# Patient Record
Sex: FEMALE | Race: WHITE | NOT HISPANIC OR LATINO | URBAN - METROPOLITAN AREA
[De-identification: names, ages, dates, MRNs, and addresses within clinical notes are randomized per-mention and may not be internally consistent; named-entity substitution may affect disease eponyms.]

---

## 2019-07-16 ENCOUNTER — INPATIENT (INPATIENT)
Facility: HOSPITAL | Age: 69
LOS: 2 days | Discharge: HOME | End: 2019-07-19
Attending: GENERAL PRACTICE | Admitting: GENERAL PRACTICE
Payer: MEDICARE

## 2019-07-16 VITALS
TEMPERATURE: 98 F | DIASTOLIC BLOOD PRESSURE: 71 MMHG | RESPIRATION RATE: 18 BRPM | HEART RATE: 108 BPM | SYSTOLIC BLOOD PRESSURE: 146 MMHG

## 2019-07-16 DIAGNOSIS — R93.1 ABNORMAL FINDINGS ON DIAGNOSTIC IMAGING OF HEART AND CORONARY CIRCULATION: ICD-10-CM

## 2019-07-16 LAB
ALBUMIN SERPL ELPH-MCNC: 3.9 G/DL — SIGNIFICANT CHANGE UP (ref 3.3–5)
ALP SERPL-CCNC: 70 U/L — SIGNIFICANT CHANGE UP (ref 40–120)
ALT FLD-CCNC: 12 U/L — SIGNIFICANT CHANGE UP (ref 4–33)
ANION GAP SERPL CALC-SCNC: 13 MMO/L — SIGNIFICANT CHANGE UP (ref 7–14)
APTT BLD: 30 SEC — SIGNIFICANT CHANGE UP (ref 27.5–36.3)
AST SERPL-CCNC: 22 U/L — SIGNIFICANT CHANGE UP (ref 4–32)
BASE EXCESS BLDV CALC-SCNC: -0.5 MMOL/L — SIGNIFICANT CHANGE UP
BASOPHILS # BLD AUTO: 0.02 K/UL — SIGNIFICANT CHANGE UP (ref 0–0.2)
BASOPHILS NFR BLD AUTO: 0.3 % — SIGNIFICANT CHANGE UP (ref 0–2)
BILIRUB SERPL-MCNC: 0.7 MG/DL — SIGNIFICANT CHANGE UP (ref 0.2–1.2)
BLOOD GAS VENOUS - CREATININE: 0.66 MG/DL — SIGNIFICANT CHANGE UP (ref 0.5–1.3)
BUN SERPL-MCNC: 21 MG/DL — SIGNIFICANT CHANGE UP (ref 7–23)
CALCIUM SERPL-MCNC: 9.8 MG/DL — SIGNIFICANT CHANGE UP (ref 8.4–10.5)
CHLORIDE BLDV-SCNC: 108 MMOL/L — SIGNIFICANT CHANGE UP (ref 96–108)
CHLORIDE SERPL-SCNC: 106 MMOL/L — SIGNIFICANT CHANGE UP (ref 98–107)
CO2 SERPL-SCNC: 22 MMOL/L — SIGNIFICANT CHANGE UP (ref 22–31)
CREAT SERPL-MCNC: 0.68 MG/DL — SIGNIFICANT CHANGE UP (ref 0.5–1.3)
EOSINOPHIL # BLD AUTO: 0.05 K/UL — SIGNIFICANT CHANGE UP (ref 0–0.5)
EOSINOPHIL NFR BLD AUTO: 0.7 % — SIGNIFICANT CHANGE UP (ref 0–6)
GAS PNL BLDV: 139 MMOL/L — SIGNIFICANT CHANGE UP (ref 136–146)
GLUCOSE BLDV-MCNC: 127 MG/DL — HIGH (ref 70–99)
GLUCOSE SERPL-MCNC: 136 MG/DL — HIGH (ref 70–99)
HCO3 BLDV-SCNC: 23 MMOL/L — SIGNIFICANT CHANGE UP (ref 20–27)
HCT VFR BLD CALC: 37.5 % — SIGNIFICANT CHANGE UP (ref 34.5–45)
HCT VFR BLDV CALC: 38.3 % — SIGNIFICANT CHANGE UP (ref 34.5–45)
HGB BLD-MCNC: 11.9 G/DL — SIGNIFICANT CHANGE UP (ref 11.5–15.5)
HGB BLDV-MCNC: 12.5 G/DL — SIGNIFICANT CHANGE UP (ref 11.5–15.5)
IMM GRANULOCYTES NFR BLD AUTO: 0.4 % — SIGNIFICANT CHANGE UP (ref 0–1.5)
INR BLD: 1.07 — SIGNIFICANT CHANGE UP (ref 0.88–1.17)
LACTATE BLDV-MCNC: 1.9 MMOL/L — SIGNIFICANT CHANGE UP (ref 0.5–2)
LYMPHOCYTES # BLD AUTO: 1.54 K/UL — SIGNIFICANT CHANGE UP (ref 1–3.3)
LYMPHOCYTES # BLD AUTO: 21.3 % — SIGNIFICANT CHANGE UP (ref 13–44)
MCHC RBC-ENTMCNC: 29.8 PG — SIGNIFICANT CHANGE UP (ref 27–34)
MCHC RBC-ENTMCNC: 31.7 % — LOW (ref 32–36)
MCV RBC AUTO: 94 FL — SIGNIFICANT CHANGE UP (ref 80–100)
MONOCYTES # BLD AUTO: 0.72 K/UL — SIGNIFICANT CHANGE UP (ref 0–0.9)
MONOCYTES NFR BLD AUTO: 10 % — SIGNIFICANT CHANGE UP (ref 2–14)
NEUTROPHILS # BLD AUTO: 4.86 K/UL — SIGNIFICANT CHANGE UP (ref 1.8–7.4)
NEUTROPHILS NFR BLD AUTO: 67.3 % — SIGNIFICANT CHANGE UP (ref 43–77)
NRBC # FLD: 0 K/UL — SIGNIFICANT CHANGE UP (ref 0–0)
PCO2 BLDV: 45 MMHG — SIGNIFICANT CHANGE UP (ref 41–51)
PH BLDV: 7.35 PH — SIGNIFICANT CHANGE UP (ref 7.32–7.43)
PLATELET # BLD AUTO: 300 K/UL — SIGNIFICANT CHANGE UP (ref 150–400)
PMV BLD: 9.9 FL — SIGNIFICANT CHANGE UP (ref 7–13)
PO2 BLDV: 38 MMHG — SIGNIFICANT CHANGE UP (ref 35–40)
POTASSIUM BLDV-SCNC: 3.7 MMOL/L — SIGNIFICANT CHANGE UP (ref 3.4–4.5)
POTASSIUM SERPL-MCNC: 4 MMOL/L — SIGNIFICANT CHANGE UP (ref 3.5–5.3)
POTASSIUM SERPL-SCNC: 4 MMOL/L — SIGNIFICANT CHANGE UP (ref 3.5–5.3)
PROT SERPL-MCNC: 7.7 G/DL — SIGNIFICANT CHANGE UP (ref 6–8.3)
PROTHROM AB SERPL-ACNC: 11.9 SEC — SIGNIFICANT CHANGE UP (ref 9.8–13.1)
RBC # BLD: 3.99 M/UL — SIGNIFICANT CHANGE UP (ref 3.8–5.2)
RBC # FLD: 16.3 % — HIGH (ref 10.3–14.5)
SAO2 % BLDV: 64.4 % — SIGNIFICANT CHANGE UP (ref 60–85)
SODIUM SERPL-SCNC: 141 MMOL/L — SIGNIFICANT CHANGE UP (ref 135–145)
WBC # BLD: 7.22 K/UL — SIGNIFICANT CHANGE UP (ref 3.8–10.5)
WBC # FLD AUTO: 7.22 K/UL — SIGNIFICANT CHANGE UP (ref 3.8–10.5)

## 2019-07-16 NOTE — ED PROVIDER NOTE - OBJECTIVE STATEMENT
AV: 69y F PMH breast ca s/p resection s/p chemo s/p RT on herceptin presents with abnormal echo. Patient was sent by onc (Ike Aquino) to have echo in setting of intermittent shortness of breath. Today echo revealed decreased LV function, sent to emergency department for admission. Denies chest pain or shortness of breath at this time.

## 2019-07-16 NOTE — ED PROVIDER NOTE - ATTENDING CONTRIBUTION TO CARE
Gong: I have seen and examined the patient face to face, have reviewed and addended the HPI, PE and a/p as necessary    69y F PMH breast ca s/p resection s/p chemo s/p RT on herceptin presents with abnormal echo. Patient was sent by onc (Ike Aquino) to have echo in setting of intermittent shortness of breath. Today echo revealed decreased LV function, sent to emergency department for admission. Denies chest pain or shortness of breath at this time.    68 yo F with breast CA s/p resection, chemo and radiation on herpceptin, a/w abnormal outpt echo showing severe LV dysfunction.  Pt was seen by Dr. Andrade who referred to ED for ischemic work up.  Pt reports having worsening shortness of breath over the past month since completing chemo.  Reports exertional sob when walking up stairs.  denies current chest pain or shortness of breath.      GEN - NAD; well appearing; A+O x3; non-toxic appearing  CARD -s1s2, RRR, no M,G,R;   PULM - CTA b/l, symmetric breath sounds;   ABD:  +BS, ND, NT, soft, no guarding, no rebound, no masses;   BACK: no CVA tenderness, Normal  spine;   EXT: symmetric pulses, 2+ dp, capillary refill < 2 seconds, no clubbing, no cyanosis, no edema    EKG NSR 99 with twave inverison in v2. No stemi.    68 yo F a/w severe LV dysfunciton on outpt echo sent in for admission for ischemic work up.  Will check troponin,r repeat ekg, chest xray, re-eval.  discuss with Dr. Andrade and tami for admission.

## 2019-07-16 NOTE — ED PROVIDER NOTE - CLINICAL SUMMARY MEDICAL DECISION MAKING FREE TEXT BOX
AV: abnormal echo sent in for admission. tachy, afebrile. non-focal exam. Will send basic labs, admit.

## 2019-07-16 NOTE — ED ADULT TRIAGE NOTE - CHIEF COMPLAINT QUOTE
Pt with left breast cancer finished last radiation treatments last Friday. Pt was sent in today by her cardiologist for abnormal  echo and for admission.  Pt deneis sob or chest pain at this time.

## 2019-07-16 NOTE — ED PROVIDER NOTE - CONSTITUTIONAL, MLM
normal... Well appearing, well nourished, awake, alert, oriented to person, place, time/situation and in no apparent distress. Additional Progress Note...

## 2019-07-16 NOTE — ED ADULT NURSE NOTE - OBJECTIVE STATEMENT
Pt with left breast cancer finished last radiation treatments last Friday. Pt was sent in today by her cardiologist for abnormal  echo and for admission.  Pt deneis sob or chest pain at this time.  Patient to room 22 and has  at bedside. Pt evaluated by MD. IVL placed to right AC 20 gauge and labs drawn and sent. Pt has no c/o pain at this time. Pt was sent to be admitted by her PMD and is aware. Pt waiting for results, further orders and bed assignment.    ORLANDO Mendez

## 2019-07-16 NOTE — ED ADULT NURSE REASSESSMENT NOTE - NS ED NURSE REASSESS COMMENT FT1
pt reassessed before calling report to ORLANDO XAVIER. pt denies any distress/ pain/ sob/ palpitations at this time. pt states she feels "great". report given to ORLANDO Xavier. will continue to monitor .

## 2019-07-17 DIAGNOSIS — E03.9 HYPOTHYROIDISM, UNSPECIFIED: ICD-10-CM

## 2019-07-17 DIAGNOSIS — R93.1 ABNORMAL FINDINGS ON DIAGNOSTIC IMAGING OF HEART AND CORONARY CIRCULATION: ICD-10-CM

## 2019-07-17 DIAGNOSIS — Z98.890 OTHER SPECIFIED POSTPROCEDURAL STATES: Chronic | ICD-10-CM

## 2019-07-17 DIAGNOSIS — Z29.9 ENCOUNTER FOR PROPHYLACTIC MEASURES, UNSPECIFIED: ICD-10-CM

## 2019-07-17 LAB
ANION GAP SERPL CALC-SCNC: 13 MMO/L — SIGNIFICANT CHANGE UP (ref 7–14)
BUN SERPL-MCNC: 18 MG/DL — SIGNIFICANT CHANGE UP (ref 7–23)
CALCIUM SERPL-MCNC: 9.4 MG/DL — SIGNIFICANT CHANGE UP (ref 8.4–10.5)
CHLORIDE SERPL-SCNC: 105 MMOL/L — SIGNIFICANT CHANGE UP (ref 98–107)
CHOLEST SERPL-MCNC: 187 MG/DL — SIGNIFICANT CHANGE UP (ref 120–199)
CK MB BLD-MCNC: 1.26 NG/ML — SIGNIFICANT CHANGE UP (ref 1–4.7)
CK SERPL-CCNC: 42 U/L — SIGNIFICANT CHANGE UP (ref 25–170)
CO2 SERPL-SCNC: 22 MMOL/L — SIGNIFICANT CHANGE UP (ref 22–31)
CREAT SERPL-MCNC: 0.56 MG/DL — SIGNIFICANT CHANGE UP (ref 0.5–1.3)
GLUCOSE SERPL-MCNC: 86 MG/DL — SIGNIFICANT CHANGE UP (ref 70–99)
HBA1C BLD-MCNC: 5.6 % — SIGNIFICANT CHANGE UP (ref 4–5.6)
HCT VFR BLD CALC: 37.3 % — SIGNIFICANT CHANGE UP (ref 34.5–45)
HDLC SERPL-MCNC: 45 MG/DL — SIGNIFICANT CHANGE UP (ref 45–65)
HGB BLD-MCNC: 12 G/DL — SIGNIFICANT CHANGE UP (ref 11.5–15.5)
LIPID PNL WITH DIRECT LDL SERPL: 134 MG/DL — SIGNIFICANT CHANGE UP
MAGNESIUM SERPL-MCNC: 1.9 MG/DL — SIGNIFICANT CHANGE UP (ref 1.6–2.6)
MCHC RBC-ENTMCNC: 30.8 PG — SIGNIFICANT CHANGE UP (ref 27–34)
MCHC RBC-ENTMCNC: 32.2 % — SIGNIFICANT CHANGE UP (ref 32–36)
MCV RBC AUTO: 95.9 FL — SIGNIFICANT CHANGE UP (ref 80–100)
NRBC # FLD: 0 K/UL — SIGNIFICANT CHANGE UP (ref 0–0)
NT-PROBNP SERPL-SCNC: 1211 PG/ML — SIGNIFICANT CHANGE UP
PHOSPHATE SERPL-MCNC: 3.3 MG/DL — SIGNIFICANT CHANGE UP (ref 2.5–4.5)
PLATELET # BLD AUTO: 284 K/UL — SIGNIFICANT CHANGE UP (ref 150–400)
PMV BLD: 9.9 FL — SIGNIFICANT CHANGE UP (ref 7–13)
POTASSIUM SERPL-MCNC: 3.9 MMOL/L — SIGNIFICANT CHANGE UP (ref 3.5–5.3)
POTASSIUM SERPL-SCNC: 3.9 MMOL/L — SIGNIFICANT CHANGE UP (ref 3.5–5.3)
RBC # BLD: 3.89 M/UL — SIGNIFICANT CHANGE UP (ref 3.8–5.2)
RBC # FLD: 16.4 % — HIGH (ref 10.3–14.5)
SODIUM SERPL-SCNC: 140 MMOL/L — SIGNIFICANT CHANGE UP (ref 135–145)
TRIGL SERPL-MCNC: 129 MG/DL — SIGNIFICANT CHANGE UP (ref 10–149)
TROPONIN T, HIGH SENSITIVITY: 8 NG/L — SIGNIFICANT CHANGE UP (ref ?–14)
TSH SERPL-MCNC: 6.02 UIU/ML — HIGH (ref 0.27–4.2)
WBC # BLD: 5.27 K/UL — SIGNIFICANT CHANGE UP (ref 3.8–10.5)
WBC # FLD AUTO: 5.27 K/UL — SIGNIFICANT CHANGE UP (ref 3.8–10.5)

## 2019-07-17 PROCEDURE — 93306 TTE W/DOPPLER COMPLETE: CPT | Mod: 26

## 2019-07-17 RX ORDER — HEPARIN SODIUM 5000 [USP'U]/ML
5000 INJECTION INTRAVENOUS; SUBCUTANEOUS EVERY 12 HOURS
Refills: 0 | Status: DISCONTINUED | OUTPATIENT
Start: 2019-07-17 | End: 2019-07-19

## 2019-07-17 RX ORDER — LISINOPRIL 2.5 MG/1
2.5 TABLET ORAL DAILY
Refills: 0 | Status: DISCONTINUED | OUTPATIENT
Start: 2019-07-17 | End: 2019-07-19

## 2019-07-17 RX ORDER — LEVOTHYROXINE SODIUM 125 MCG
50 TABLET ORAL DAILY
Refills: 0 | Status: DISCONTINUED | OUTPATIENT
Start: 2019-07-17 | End: 2019-07-19

## 2019-07-17 RX ORDER — LISINOPRIL 2.5 MG/1
2.5 TABLET ORAL DAILY
Refills: 0 | Status: DISCONTINUED | OUTPATIENT
Start: 2019-07-17 | End: 2019-07-17

## 2019-07-17 RX ORDER — METOPROLOL TARTRATE 50 MG
12.5 TABLET ORAL EVERY 12 HOURS
Refills: 0 | Status: DISCONTINUED | OUTPATIENT
Start: 2019-07-17 | End: 2019-07-18

## 2019-07-17 RX ORDER — METOPROLOL TARTRATE 50 MG
12.5 TABLET ORAL
Refills: 0 | Status: DISCONTINUED | OUTPATIENT
Start: 2019-07-17 | End: 2019-07-17

## 2019-07-17 RX ADMIN — Medication 50 MICROGRAM(S): at 05:38

## 2019-07-17 RX ADMIN — LISINOPRIL 2.5 MILLIGRAM(S): 2.5 TABLET ORAL at 09:46

## 2019-07-17 RX ADMIN — Medication 1 APPLICATION(S): at 17:11

## 2019-07-17 RX ADMIN — Medication 12.5 MILLIGRAM(S): at 17:24

## 2019-07-17 NOTE — H&P ADULT - NSHPLABSRESULTS_GEN_ALL_CORE
11.9   7.22  )-----------( 300      ( 16 Jul 2019 18:55 )             37.5     07-16    141  |  106  |  21  ----------------------------<  136<H>  4.0   |  22  |  0.68    Ca    9.8      16 Jul 2019 18:55    TPro  7.7  /  Alb  3.9  /  TBili  0.7  /  DBili  x   /  AST  22  /  ALT  12  /  AlkPhos  70  07-16    EKG: Sinus rhythm with PAC, possible LAE, LAD at a rate of 99 with TWI in lead V2 and QTc of 454.

## 2019-07-17 NOTE — H&P ADULT - RS GEN PE MLT RESP DETAILS PC
no rhonchi/breath sounds equal/no rales/clear to auscultation bilaterally/good air movement/respirations non-labored/no wheezes/no chest wall tenderness/no intercostal retractions/normal/airway patent

## 2019-07-17 NOTE — H&P ADULT - NSHPSOCIALHISTORY_GEN_ALL_CORE
Mom is calling because she would like Dotty to [facility] [employer] with her. Since patient is under 18 they need a doctors note. Mom is wondering if  would be able to do this. Mom aware that  is out of the office today and tomorrow.   , lives with , retired   Quit smoking in November 2018 and smoked 1 pack a day/denied any drinking or any illicit drug use

## 2019-07-17 NOTE — H&P ADULT - NEGATIVE OPHTHALMOLOGIC SYMPTOMS
no lacrimation L/no photophobia/no diplopia/no blurred vision L/no lacrimation R/no blurred vision R/no discharge R/no discharge L

## 2019-07-17 NOTE — H&P ADULT - NEGATIVE NEUROLOGICAL SYMPTOMS
no generalized seizures/no tremors/no loss of sensation/no transient paralysis/no weakness/no paresthesias/no loss of consciousness/no hemiparesis/no syncope/no difficulty walking/no focal seizures/no vertigo/no headache/no confusion

## 2019-07-17 NOTE — H&P ADULT - GASTROINTESTINAL DETAILS
nontender/no guarding/no rigidity/soft/no rebound tenderness/normal/no distention/bowel sounds normal

## 2019-07-17 NOTE — H&P ADULT - PROBLEM SELECTOR PLAN 1
Patient sent from cardiologist office for abnormal TTE. Currently euvolemic on physical examination   Will monitor on telemetry for now   HgBA1C, TSH, lipid profile, CBC, CMP in am   TTE ordered   Will need to call Dr. Andrade in am for Cardiology consult   Consider ischemic workup with NST vs LHC this admission to r/o possible ICM vs NICM(chemo induced)

## 2019-07-17 NOTE — H&P ADULT - NEGATIVE ENMT SYMPTOMS
no nasal congestion/no hearing difficulty/no ear pain/no nasal discharge/no tinnitus/no vertigo/no sinus symptoms

## 2019-07-17 NOTE — H&P ADULT - NEGATIVE GASTROINTESTINAL SYMPTOMS
no melena/no constipation/no hematochezia/no diarrhea/no nausea/no abdominal pain/no vomiting/no change in bowel habits

## 2019-07-17 NOTE — H&P ADULT - NEGATIVE MUSCULOSKELETAL SYMPTOMS
no myalgia/no arthralgia/no joint swelling/no muscle weakness/no arthritis/no stiffness/no muscle cramps/no neck pain

## 2019-07-17 NOTE — H&P ADULT - HISTORY OF PRESENT ILLNESS
70 y/o F with PMH of Breast cancer(s/p resection and chemo/RT and currently on Herceptin), Hypothyroidism presented from cardiology clinic for abnormal TTE. As per the patient she had intermittent SOB when she climbs a flight of stairs or when she is sitting up after laying down. Patient stated that she has gotten 28 sessions of radiation of after the first radiation treatment 3 weeks later she developed the SOB. Since patient was started on IV chemotherapy(has 3 more doses left every 3 weeks and last done was last week) she was told by her oncologist to go and see a cardiologist which she did yesterday and had a TTE and was told that she had decreased LV function and needed to come to the ER. Patient denied any CP, RAY, fevers, chills, N/V/D/C, abdominal pain, dysuria, melena, hematochezia, recent travel, sick contact, pleuritic or positional chest pain.     On ED admission EKG revealed Sinus rhythm with PAC, possible LAE, LAD at a rate of 99 with TWI in lead V2 and QTc of 454. 70 y/o F with PMH of Breast cancer(s/p resection and chemo/RT and currently on Herceptin), Hypothyroidism presented from cardiology clinic for abnormal TTE. As per the patient she had intermittent SOB when she climbs a flight of stairs or when she is sitting up after laying down. Patient stated that she has gotten 28 sessions of radiation of after the first radiation treatment 3 weeks later she developed the SOB.   Since patient was started on IV chemotherapy(has 3 more doses left every 3 weeks and last done was last week) she was told by her oncologist to go and see a cardiologist which she did yesterday and had a TTE and was told that she had decreased LV function and needed to come to the ER. Patient denied any CP, RAY, fevers, chills, N/V/D/C, abdominal pain, dysuria, melena, hematochezia, recent travel, sick contact, pleuritic or positional chest pain.     On ED admission EKG revealed Sinus rhythm with PAC, possible LAE, LAD at a rate of 99 with TWI in lead V2 and QTc of 454.

## 2019-07-17 NOTE — H&P ADULT - NSICDXPASTMEDICALHX_GEN_ALL_CORE_FT
PAST MEDICAL HISTORY:  Breast cancer left side s/p lumpectomy, chemotherapy and radiation    Hypothyroidism

## 2019-07-17 NOTE — CONSULT NOTE ADULT - SUBJECTIVE AND OBJECTIVE BOX
CHIEF COMPLAINT: chf    HISTORY OF PRESENT ILLNESS:  70 y/o F with PMH of Breast cancer(s/p resection and chemo/RT and currently on Herceptin), Hypothyroidism presented from cardiology clinic for abnormal TTE. As per the patient she had intermittent SOB when she climbs a flight of stairs or when she is sitting up after laying down. Patient stated that she has gotten 28 sessions of radiation of after the first radiation treatment 3 weeks later she developed the SOB. Since patient was started on IV chemotherapy(has 3 more doses left every 3 weeks and last done was last week) she was told by her oncologist to go and see a cardiologist which she did yesterday and had a TTE and was told that she had decreased LV function and needed to come to the ER. Patient denied any CP, RAY, fevers, chills, N/V/D/C, abdominal pain, dysuria, melena, hematochezia, recent travel, sick contact, pleuritic or positional chest pain.     On ED admission EKG revealed Sinus rhythm with PAC, possible LAE, LAD at a rate of 99 with TWI in lead V2 and QTc of 454.     Allergies  No Known Allergies        MEDICATIONS:  heparin  Injectable 5000 Unit(s) SubCutaneous every 12 hours  levothyroxine 50 MICROGram(s) Oral daily        PAST MEDICAL & SURGICAL HISTORY:  Hypothyroidism  Breast cancer: left side s/p lumpectomy, chemotherapy and radiation  S/P breast lumpectomy: left sided      FAMILY HISTORY:  NC    SOCIAL HISTORY:    non smoker. indpendent in adl. lives with       REVIEW OF SYSTEMS:  See HPI, otherwise complete 10 point review of systems negative    [ ] All others negative	    PHYSICAL EXAM:  T(C): 36.6 (07-17-19 @ 05:31), Max: 36.8 (07-16-19 @ 17:56)  HR: 106 (07-17-19 @ 05:31) (99 - 108)  BP: 119/81 (07-17-19 @ 05:31) (119/81 - 149/81)  RR: 17 (07-17-19 @ 05:31) (17 - 18)  SpO2: 100% (07-17-19 @ 05:31) (98% - 100%)  Wt(kg): --  I&O's Summary      Appearance: No Acute Distress	  HEENT:  Normal oral mucosa, PERRL, EOMI	  Cardiovascular: Normal S1 S2, No JVD, No murmurs/rubs/gallops  Respiratory: Lungs clear to auscultation bilaterally  Gastrointestinal:  Soft, Non-tender, + BS	  Skin: No rashes, No ecchymoses, No cyanosis	  Neurologic: Non-focal  Extremities: No clubbing, cyanosis or edema  Vascular: Peripheral pulses palpable 2+ bilaterally  Psychiatry: A & O x 3, Mood & affect appropriate    Laboratory Data:	 	    CBC Full  -  ( 17 Jul 2019 04:30 )  WBC Count : 5.27 K/uL  Hemoglobin : 12.0 g/dL  Hematocrit : 37.3 %  Platelet Count - Automated : 284 K/uL  Mean Cell Volume : 95.9 fL  Mean Cell Hemoglobin : 30.8 pg  Mean Cell Hemoglobin Concentration : 32.2 %  Auto Neutrophil # : x  Auto Lymphocyte # : x  Auto Monocyte # : x  Auto Eosinophil # : x  Auto Basophil # : x  Auto Neutrophil % : x  Auto Lymphocyte % : x  Auto Monocyte % : x  Auto Eosinophil % : x  Auto Basophil % : x    07-17    140  |  105  |  18  ----------------------------<  86  3.9   |  22  |  0.56  07-16    141  |  106  |  21  ----------------------------<  136<H>  4.0   |  22  |  0.68    Ca    9.4      17 Jul 2019 04:30  Ca    9.8      16 Jul 2019 18:55  Phos  3.3     07-17  Mg     1.9     07-17    TPro  7.7  /  Alb  3.9  /  TBili  0.7  /  DBili  x   /  AST  22  /  ALT  12  /  AlkPhos  70  07-16      proBNP: Serum Pro-Brain Natriuretic Peptide: 1211 pg/mL (07-17 @ 04:30)    Lipid Profile:   HgA1c: Hemoglobin A1C, Whole Blood: 5.6 % (07-17 @ 04:30)    TSH: Thyroid Stimulating Hormone, Serum: 6.02 uIU/mL (07-17 @ 04:30)        CARDIAC MARKERS:      CKMB: 1.26 ng/mL (07-17 @ 04:30)        Interpretation of Telemetry: 	NSR    ECG:  EKG revealed Sinus rhythm with PAC, possible LAE, LAD at a rate of 99 with TWI in lead V2 and QTc of 454. 	      Assessment:  -newly diagnoses severe LV dysfunction with moderate to severe AI  -hx of chemotherapy for breast cancer, currently on herceptin  -dyspnea prior to admission, currently resolved    Recs:  -start lisinopril 2.5mg daily (would prefer valsartan 40mg bid if can get approved to allow for smooth transition to entresto)  -start metop tart 12.5mg bid. will transition to long acting prior to d/c  -hold MCA/aldactone for now  -euvolemic off diuretics  -f/u repeat TTE  -discuss with OP oncologist regarding management of herceptin in setting of severe LV dysfunction  -plan for ischemic eval with [facility] tomm  -c/w tele  -possible life vest prior to dc for primary prevention  -dvt ppx        Greater than 60 minutes spent on total encounter; more than 50% of the visit was spent counseling and/or coordinating care by the attending physician.   	  Shar Andrade MD   Cardiovascular Diseases  [phone]

## 2019-07-17 NOTE — H&P ADULT - NEGATIVE CARDIOVASCULAR SYMPTOMS
no peripheral edema/no chest pain/no orthopnea/no dyspnea on exertion/no paroxysmal nocturnal dyspnea/no palpitations

## 2019-07-17 NOTE — H&P ADULT - ATTENDING COMMENTS
Patient seen, examined, and interviewed by me, case discussed with me, chart reviewed, agree with above H/P as reviewed and edited by me.  See  full note above.  admission for new finding of systolic HF   medical optimization  repeat echo  cardio mgmt appreciated and discussed

## 2019-07-18 LAB
ANION GAP SERPL CALC-SCNC: 10 MMO/L — SIGNIFICANT CHANGE UP (ref 7–14)
BUN SERPL-MCNC: 16 MG/DL — SIGNIFICANT CHANGE UP (ref 7–23)
CALCIUM SERPL-MCNC: 9.2 MG/DL — SIGNIFICANT CHANGE UP (ref 8.4–10.5)
CHLORIDE SERPL-SCNC: 109 MMOL/L — HIGH (ref 98–107)
CO2 SERPL-SCNC: 21 MMOL/L — LOW (ref 22–31)
CREAT SERPL-MCNC: 0.57 MG/DL — SIGNIFICANT CHANGE UP (ref 0.5–1.3)
GLUCOSE SERPL-MCNC: 88 MG/DL — SIGNIFICANT CHANGE UP (ref 70–99)
HCT VFR BLD CALC: 37.2 % — SIGNIFICANT CHANGE UP (ref 34.5–45)
HCV AB S/CO SERPL IA: 0.21 S/CO — SIGNIFICANT CHANGE UP (ref 0–0.99)
HCV AB SERPL-IMP: SIGNIFICANT CHANGE UP
HGB BLD-MCNC: 12.1 G/DL — SIGNIFICANT CHANGE UP (ref 11.5–15.5)
MAGNESIUM SERPL-MCNC: 2 MG/DL — SIGNIFICANT CHANGE UP (ref 1.6–2.6)
MCHC RBC-ENTMCNC: 31.3 PG — SIGNIFICANT CHANGE UP (ref 27–34)
MCHC RBC-ENTMCNC: 32.5 % — SIGNIFICANT CHANGE UP (ref 32–36)
MCV RBC AUTO: 96.1 FL — SIGNIFICANT CHANGE UP (ref 80–100)
NRBC # FLD: 0 K/UL — SIGNIFICANT CHANGE UP (ref 0–0)
PHOSPHATE SERPL-MCNC: 3.4 MG/DL — SIGNIFICANT CHANGE UP (ref 2.5–4.5)
PLATELET # BLD AUTO: 286 K/UL — SIGNIFICANT CHANGE UP (ref 150–400)
PMV BLD: 10.3 FL — SIGNIFICANT CHANGE UP (ref 7–13)
POTASSIUM SERPL-MCNC: 3.9 MMOL/L — SIGNIFICANT CHANGE UP (ref 3.5–5.3)
POTASSIUM SERPL-SCNC: 3.9 MMOL/L — SIGNIFICANT CHANGE UP (ref 3.5–5.3)
RBC # BLD: 3.87 M/UL — SIGNIFICANT CHANGE UP (ref 3.8–5.2)
RBC # FLD: 16.6 % — HIGH (ref 10.3–14.5)
SODIUM SERPL-SCNC: 140 MMOL/L — SIGNIFICANT CHANGE UP (ref 135–145)
WBC # BLD: 4.94 K/UL — SIGNIFICANT CHANGE UP (ref 3.8–10.5)
WBC # FLD AUTO: 4.94 K/UL — SIGNIFICANT CHANGE UP (ref 3.8–10.5)

## 2019-07-18 PROCEDURE — 93320 DOPPLER ECHO COMPLETE: CPT | Mod: 26,GC

## 2019-07-18 PROCEDURE — 76376 3D RENDER W/INTRP POSTPROCES: CPT | Mod: 26

## 2019-07-18 PROCEDURE — 93312 ECHO TRANSESOPHAGEAL: CPT | Mod: 26

## 2019-07-18 PROCEDURE — 93325 DOPPLER ECHO COLOR FLOW MAPG: CPT | Mod: 26,GC

## 2019-07-18 RX ORDER — METOPROLOL TARTRATE 50 MG
25 TABLET ORAL
Refills: 0 | Status: DISCONTINUED | OUTPATIENT
Start: 2019-07-18 | End: 2019-07-19

## 2019-07-18 RX ORDER — METOPROLOL TARTRATE 50 MG
25 TABLET ORAL
Refills: 0 | Status: DISCONTINUED | OUTPATIENT
Start: 2019-07-18 | End: 2019-07-18

## 2019-07-18 RX ADMIN — Medication 50 MICROGRAM(S): at 14:58

## 2019-07-18 RX ADMIN — Medication 1 APPLICATION(S): at 17:02

## 2019-07-18 RX ADMIN — Medication 25 MILLIGRAM(S): at 17:02

## 2019-07-18 RX ADMIN — Medication 1 APPLICATION(S): at 05:13

## 2019-07-18 RX ADMIN — LISINOPRIL 2.5 MILLIGRAM(S): 2.5 TABLET ORAL at 17:02

## 2019-07-18 NOTE — PROGRESS NOTE ADULT - ASSESSMENT
_________________________________________________________________________________________  ========>>  M E D I C A L   A T T E N D I N G    F O L L O W  U P  N O T E  <<=========  -----------------------------------------------------------------------------------------------------    - Patient seen and examined by me approximately thirty minutes ago.  - In summary,  STERLING CHEN is a 69y year old woman who originally presented with new HF   - Patient today overall doing ok, comfortable, eating OK.  no SOb, no CP,, no other c.o otherwise     ==================>> REVIEW OF SYSTEM <<=================    GEN: no fever, no chills, no pain  RESP: no SOB, no cough, no sputum  CVS: no chest pain, no palpitations, no edema  GI: no abdominal pain, no nausea, no constipation, no diarrhea  : no dysuria, no frequency, no hematuria  Neuro: no headache, no dizziness  Derm : no itching, no rash    ==================>> PHYSICAL EXAM <<=================    GEN: A&O X 3 , NAD , comfortable  HEENT: NCAT, PERRL, MMM, hearing intact  Neck: supple , no JVD  CVS: S1S2 , regular , No M/R/G appreciated  PULM: CTA B/L,  no W/R/R appreciated  ABD.: soft. non tender, non distended,  bowel sounds present  Extrem: intact pulses , no edema   PSYCH : normal mood,  not anxious      ==================>> MEDICATIONS <<====================    MEDICATIONS  (STANDING):  heparin  Injectable 5000 Unit(s) SubCutaneous every 12 hours  levothyroxine 50 MICROGram(s) Oral daily  lisinopril 2.5 milliGRAM(s) Oral daily  metoprolol tartrate 25 milliGRAM(s) Oral two times a day  silver sulfADIAZINE 1% Cream 1 Application(s) Topical two times a day    ==================>> VITAL SIGNS <<==================    T(C): 36.7 (07-18-19 @ 17:00), Max: 36.8 (07-17-19 @ 20:15)  HR: 88 (07-18-19 @ 17:00) (78 - 88)  BP: 122/78 (07-18-19 @ 17:00) (101/60 - 122/78)  RR: 18 (07-18-19 @ 17:00) (16 - 18)  SpO2: 98% (07-18-19 @ 17:00) (98% - 100%)    I&O's Summary    17 Jul 2019 07:01  -  18 Jul 2019 07:00  --------------------------------------------------------  IN: 200 mL / OUT: 0 mL / NET: 200 mL     ==================>> LAB AND IMAGING <<==================                        12.1   4.94  )-----------( 286      ( 18 Jul 2019 04:55 )             37.2        140  |  109<H>  |  16  ----------------------------<  88  3.9   |  21<L>  |  0.57    Ca    9.2      18 Jul 2019 04:55  Phos  3.4     07-18  Mg     2.0     07-18    TPro  7.7  /  Alb  3.9  /  TBili  0.7  /  DBili  x   /  AST  22  /  ALT  12  /  AlkPhos  70  07-16    PT/INR - ( 16 Jul 2019 18:55 )   PT: 11.9 SEC;   INR: 1.07     PTT - ( 16 Jul 2019 18:55 )  PTT:30.0 SEC               TSH:      6.02   (07-17-19)           Lipid profile:  (07-17-19)     Total: 187     LDL  : 134     HDL  :45     TG   :129     HgA1C: 5.6  (07-17-19)            < from: Transthoracic Echocardiogram (07.17.19 @ 08:01) >  CONCLUSIONS:  1. Calcified trileaflet aortic valve with normal opening.  At least moderate aortic regurgitation.  2. Normal left ventricular internal dimensions and wall thicknesses.  3. Moderate segmental left ventricular systolic  dysfunction. The inferior and inferolateral walls are hypokinetic.  4. Normal right ventricular size and function.  Consider PASQUALE for further workup of aortic regurgitation, if  clincallly warranted.  < end of copied text >      < from: PASQUALE w/o TTE (w/3D Echo) (07.18.19 @ 20:00) >  CONCLUSIONS:  1. Mitral annular calcification, otherwise normal mitral  valve. Mild mitral regurgitation.  2. Calcified trileaflet aortic valve with normal opening  and central malcoaptation. Severe aortic regurgitation.  Vena contracta width about  0.7-0.8 cm.  3. Normal aortic root (about  3.6 cm at the sinuses of  Valsalva).  Moderate dilatation of the ascending aorta  (about  4.4 cm).  Normal aortic arch and descending thoracic aorta.  4. Normal left atrium.  No left atrium or left atrial appendage thrombus.  5. Moderate global left ventricular systolic dysfunction.  6. Normal right ventricular size and function.  7. Contrast injection demonstrates no evidence of a patent foramen ovale.  < end of copied text >    ___________________________________________________________________________________  ===============>>  A S S E S S M E N T   A N D   P L A N <<===============  ------------------------------------------------------------------------------------------    · Assessment		  70 y/o F with PMH of Breast cancer(s/p resection and chemo/RT and Herceptin), Hypothyroidism presented from cardiology for abnormal TTE    Problem/Plan - 1:  ·  Problem: new diagnosis of systolic heart failure with AR  Currently euvolemic   telemetry    echo as above  plan for ischemic workup tomorrow   cardio appreciated     Problem/Plan - 2:  ·  Problem: Hypothyroidism.    Continue with Synthroid daily   TSH 6 > repeat ordered for t he morning     Problem/Plan - 3:  ·  Problem: Need for prophylactic measure.  Plan: On heparin sq 5000U q12hrs.     --------------------------------------------  Case discussed with pt  Education given on findings and plan of care  ___________________________  HPop Ascencio D.O.  Pager: [phone]

## 2019-07-18 NOTE — PROGRESS NOTE ADULT - SUBJECTIVE AND OBJECTIVE BOX
Cardiovascular Disease Progress Note    Overnight events: No acute events overnight.  s/p tte mod lv dysfunction, at least mod ai. awaiting PASQUALE. no new cardiac sx  Otherwise review of systems negative    Objective Findings:  T(C): 36.6 (07-18-19 @ 05:15), Max: 36.8 (07-17-19 @ 20:15)  HR: 83 (07-18-19 @ 05:15) (70 - 95)  BP: 120/63 (07-18-19 @ 05:15) (101/60 - 128/60)  RR: 16 (07-18-19 @ 05:15) (16 - 18)  SpO2: 100% (07-18-19 @ 05:15) (98% - 100%)  Wt(kg): --  Daily     Daily       Physical Exam:  Gen: NAD  HEENT: EOMI  CV: RRR, normal S1 + S2, no m/r/g  Lungs: CTAB  Abd: soft, non-tender  Ext: No edema    Telemetry:    Laboratory Data:                        12.1   4.94  )-----------( 286      ( 18 Jul 2019 04:55 )             37.2     07-18    140  |  109<H>  |  16  ----------------------------<  88  3.9   |  21<L>  |  0.57    Ca    9.2      18 Jul 2019 04:55  Phos  3.4     07-18  Mg     2.0     07-18    TPro  7.7  /  Alb  3.9  /  TBili  0.7  /  DBili  x   /  AST  22  /  ALT  12  /  AlkPhos  70  07-16    PT/INR - ( 16 Jul 2019 18:55 )   PT: 11.9 SEC;   INR: 1.07          PTT - ( 16 Jul 2019 18:55 )  PTT:30.0 SEC  CARDIAC MARKERS ( 17 Jul 2019 04:30 )  x     / x     / 42 u/L / 1.26 ng/mL / x              Inpatient Medications:  MEDICATIONS  (STANDING):  heparin  Injectable 5000 Unit(s) SubCutaneous every 12 hours  levothyroxine 50 MICROGram(s) Oral daily  lisinopril 2.5 milliGRAM(s) Oral daily  metoprolol tartrate 12.5 milliGRAM(s) Oral every 12 hours  silver sulfADIAZINE 1% Cream 1 Application(s) Topical two times a day      Assessment:  -newly diagnoses severe LV dysfunction with moderate to severe AI  -hx of chemotherapy for breast cancer, currently on herceptin  -dyspnea prior to admission, currently resolved    Recs:  -c/w lisinopril 2.5mg daily (would prefer valsartan 40mg bid if can get approved to allow for smooth transition to entresto)  -inc metop tart to 25 mg bid. will transition to long acting prior to d/c  -hold MCA/aldactone for now  -euvolemic off diuretics  -f/u PASQUALE  -discuss with OP oncologist regarding management of herceptin in setting of severe LV dysfunction  -plan for ischemic eval with [facility] tomm  -c/w tele  -will defer ICD for now given EF ~ 40%  -dvt ppx        Over 25 minutes spent on total encounter; more than 50% of the visit was spent counseling and/or coordinating care by the attending physician.      Shar Andrade MD   Cardiovascular Disease  [phone]

## 2019-07-19 ENCOUNTER — TRANSCRIPTION ENCOUNTER (OUTPATIENT)
Age: 69
End: 2019-07-19

## 2019-07-19 VITALS — SYSTOLIC BLOOD PRESSURE: 100 MMHG | HEART RATE: 93 BPM | DIASTOLIC BLOOD PRESSURE: 61 MMHG

## 2019-07-19 LAB
ANION GAP SERPL CALC-SCNC: 10 MMO/L — SIGNIFICANT CHANGE UP (ref 7–14)
BUN SERPL-MCNC: 17 MG/DL — SIGNIFICANT CHANGE UP (ref 7–23)
CALCIUM SERPL-MCNC: 9.9 MG/DL — SIGNIFICANT CHANGE UP (ref 8.4–10.5)
CHLORIDE SERPL-SCNC: 106 MMOL/L — SIGNIFICANT CHANGE UP (ref 98–107)
CO2 SERPL-SCNC: 24 MMOL/L — SIGNIFICANT CHANGE UP (ref 22–31)
CREAT SERPL-MCNC: 0.63 MG/DL — SIGNIFICANT CHANGE UP (ref 0.5–1.3)
GLUCOSE SERPL-MCNC: 94 MG/DL — SIGNIFICANT CHANGE UP (ref 70–99)
HCT VFR BLD CALC: 39.5 % — SIGNIFICANT CHANGE UP (ref 34.5–45)
HGB BLD-MCNC: 12.6 G/DL — SIGNIFICANT CHANGE UP (ref 11.5–15.5)
MAGNESIUM SERPL-MCNC: 1.9 MG/DL — SIGNIFICANT CHANGE UP (ref 1.6–2.6)
MCHC RBC-ENTMCNC: 30.2 PG — SIGNIFICANT CHANGE UP (ref 27–34)
MCHC RBC-ENTMCNC: 31.9 % — LOW (ref 32–36)
MCV RBC AUTO: 94.7 FL — SIGNIFICANT CHANGE UP (ref 80–100)
NRBC # FLD: 0 K/UL — SIGNIFICANT CHANGE UP (ref 0–0)
PHOSPHATE SERPL-MCNC: 3.5 MG/DL — SIGNIFICANT CHANGE UP (ref 2.5–4.5)
PLATELET # BLD AUTO: 290 K/UL — SIGNIFICANT CHANGE UP (ref 150–400)
PMV BLD: 9.7 FL — SIGNIFICANT CHANGE UP (ref 7–13)
POTASSIUM SERPL-MCNC: 4 MMOL/L — SIGNIFICANT CHANGE UP (ref 3.5–5.3)
POTASSIUM SERPL-SCNC: 4 MMOL/L — SIGNIFICANT CHANGE UP (ref 3.5–5.3)
RBC # BLD: 4.17 M/UL — SIGNIFICANT CHANGE UP (ref 3.8–5.2)
RBC # FLD: 16.3 % — HIGH (ref 10.3–14.5)
SODIUM SERPL-SCNC: 140 MMOL/L — SIGNIFICANT CHANGE UP (ref 135–145)
T3 SERPL-MCNC: 99.2 NG/DL — SIGNIFICANT CHANGE UP (ref 80–200)
T4 FREE SERPL-MCNC: 1.16 NG/DL — SIGNIFICANT CHANGE UP (ref 0.9–1.8)
TSH SERPL-MCNC: 15.02 UIU/ML — HIGH (ref 0.27–4.2)
WBC # BLD: 5.73 K/UL — SIGNIFICANT CHANGE UP (ref 3.8–10.5)
WBC # FLD AUTO: 5.73 K/UL — SIGNIFICANT CHANGE UP (ref 3.8–10.5)

## 2019-07-19 RX ORDER — TRASTUZUMAB-DKST 420 MG/20ML
1 INJECTION, POWDER, LYOPHILIZED, FOR SOLUTION INTRAVENOUS
Qty: 0 | Refills: 0 | DISCHARGE

## 2019-07-19 RX ORDER — METOPROLOL TARTRATE 50 MG
1 TABLET ORAL
Qty: 30 | Refills: 0
Start: 2019-07-19 | End: 2019-08-17

## 2019-07-19 RX ORDER — METOPROLOL TARTRATE 50 MG
25 TABLET ORAL DAILY
Refills: 0 | Status: DISCONTINUED | OUTPATIENT
Start: 2019-07-20 | End: 2019-07-19

## 2019-07-19 RX ORDER — LISINOPRIL 2.5 MG/1
1 TABLET ORAL
Qty: 30 | Refills: 0
Start: 2019-07-19 | End: 2019-08-17

## 2019-07-19 RX ADMIN — Medication 1 APPLICATION(S): at 16:29

## 2019-07-19 RX ADMIN — LISINOPRIL 2.5 MILLIGRAM(S): 2.5 TABLET ORAL at 16:29

## 2019-07-19 RX ADMIN — Medication 25 MILLIGRAM(S): at 05:55

## 2019-07-19 RX ADMIN — Medication 1 APPLICATION(S): at 05:56

## 2019-07-19 RX ADMIN — Medication 50 MICROGRAM(S): at 05:55

## 2019-07-19 NOTE — DISCHARGE NOTE PROVIDER - HOSPITAL COURSE
70 y/o F with PMH of Breast cancer(s/p resection and chemo/RT and currently on Herceptin), Hypothyroidism presented from cardiology clinic for abnormal TTE. As per the patient she had intermittent SOB when she climbs a flight of stairs or when she is sitting up after laying down. Patient stated that she has gotten 28 sessions of radiation of after the first radiation treatment 3 weeks later she developed the SOB. Since patient was started on IV chemotherapy(has 3 more doses left every 3 weeks and last done was last week) she was told by her oncologist to go and see a cardiologist which she did yesterday and had a TTE and was told that she had decreased LV function and needed to come to the ER.         Hospital Course:     1. Abnormal TTE: Cardio c/s, Dr. Padilla  7/17/19: start lisinopril 2.5mg daily (would prefer valsartan 40mg bid if can get approved to allow for smooth transition to entresto) Start metop 12.5mg bid. Will transition to long acting prior to d/c. Hold MCA/aldactone for now. Euvolemic off diuretics.         7/17 TTE:  EF 40, calcified trileaflet aortic valve with normal opening. At least moderate aortic regurgitation. Normal left ventricular internal dimensions and wall thicknesses. Moderate segmental left ventricular systolic dysfunction. The inferior and inferolateral walls are hypokinetic. Normal right ventricular size and function. Consider PASQUALE for further workup of aortic regurgitation, if        7/18: PASQUALE: 1. Mitral annular calcification, otherwise normal mitral valve. Mild mitral regurgitation.    2. Calcified trileaflet aortic valve with normal opening and central malcoaptation. Severe aortic regurgitation. Vena contracta width about  0.7-0.8 cm.    3. Normal aortic root (about  3.6 cm at the sinuses of Valsalva).  Moderate dilatation of the ascending aorta (about  4.4 cm).  Normal aortic arch and descending thoracic aorta.    4. Normal left atrium.  No left atrium or left atrial appendage thrombus.    5. Moderate global left ventricular systolic dysfunction.    6. Normal right ventricular size and function.    7. Contrast injection demonstrates no evidence of a patent foramen ovale.        On 7/19, Pt underwent LHC which revealed ****INCOMPLETE        Cardio f/u: OP f/u with Dr Mars to discuss surgical management of her aortic regurgitation and aortopathy. 70 y/o F with PMH of Breast cancer(s/p resection and chemo/RT and currently on Herceptin), Hypothyroidism presented from cardiology clinic for abnormal TTE. As per the patient she had intermittent SOB when she climbs a flight of stairs or when she is sitting up after laying down. Patient stated that she has gotten 28 sessions of radiation of after the first radiation treatment 3 weeks later she developed the SOB. Since patient was started on IV chemotherapy(has 3 more doses left every 3 weeks and last done was last week) she was told by her oncologist to go and see a cardiologist which she did yesterday and had a TTE and was told that she had decreased LV function and needed to come to the ER.         Hospital Course:     1. Abnormal TTE: Cardiology following Dr. Andrade echo recommended, start lisinopril and metoprolol. TTE:  EF 40%, calcified trileaflet aortic valve with normal opening. At least moderate aortic regurgitation. Normal left ventricular internal dimensions and wall thicknesses. Moderate segmental left ventricular systolic dysfunction. The inferior and inferolateral walls are hypokinetic. Normal right ventricular size and function. Consider PASQUALE for further workup of aortic regurgitation, if        7/18: PASQUALE: 1. Mitral annular calcification, otherwise normal mitral valve. Mild mitral regurgitation.    2. Calcified trileaflet aortic valve with normal opening and central malcoaptation. Severe aortic regurgitation. Vena contracta width about  0.7-0.8 cm.    3. Normal aortic root (about  3.6 cm at the sinuses of Valsalva).  Moderate dilatation of the ascending aorta (about  4.4 cm).  Normal aortic arch and descending thoracic aorta.    4. Normal left atrium.  No left atrium or left atrial appendage thrombus.    5. Moderate global left ventricular systolic dysfunction.    6. Normal right ventricular size and function.    7. Contrast injection demonstrates no evidence of a patent foramen ovale.        On 7/19, Pt underwent cardiac catheterization which revealed dilated aotic root moderately severe AI, mild to moderate global LV dysfunction (LVEF=40-45%) normal right heart pressures and normal coronaries         Cardio f/u: c/w lisinopril 2.5mg daily, toprol XL 25mg daily, no diuretics, OP f/u with Dr Mars to discuss surgical management of her aortic regurgitation and aortopathy. Case discussed with Dr. Ascencio, Pt medically cleared for discharge to home with outpt cardio and CTS f/u.

## 2019-07-19 NOTE — DISCHARGE NOTE PROVIDER - NSDCCAREPROVSEEN_GEN_ALL_CORE_FT
Emmanuelle Ascencio Daniel Jose CHAUDHRY [facility]  Amor Martineshomariam Woodson  Ordering Physician  Cole Barksdale  User ADM  Ahmet Hernandez

## 2019-07-19 NOTE — DISCHARGE NOTE PROVIDER - CARE PROVIDERS DIRECT ADDRESSES
,DirectAddress_Unknown,[email or web address]

## 2019-07-19 NOTE — PROGRESS NOTE ADULT - ASSESSMENT
M E D I C A L   A T T E N D I N G    F O L L O W    U P   N O T E                                     ------------------------------------------------------------------------------------------------    patient evaluated by me, case discussed with team, chart, medications, and physical exam reviewed, labs / tests  and vitals reviewed by me, as bellow.   Patient is stable for discharge today. per discussion with cardio: pt will ann phan as OP with CTS for AVR: pt and  aware and in agreement  Patient to follow up as above   See discharge document for full note.      ==================>> MEDICATIONS <<====================    heparin  Injectable 5000 Unit(s) SubCutaneous every 12 hours  levothyroxine 50 MICROGram(s) Oral daily  lisinopril 2.5 milliGRAM(s) Oral daily  silver sulfADIAZINE 1% Cream 1 Application(s) Topical two times a day    ==================>> VITAL SIGNS <<==================    T(C): 36.7 (07-19-19 @ 05:53), Max: 36.7 (07-18-19 @ 22:01)  HR: 93 (07-19-19 @ 16:29) (93 - 96)  BP: 100/61 (07-19-19 @ 16:29) (100/61 - 114/75)  RR: 18 (07-19-19 @ 05:53) (18 - 18)  SpO2: 98% (07-19-19 @ 05:53) (98% - 99%)       ==================>> LAB AND IMAGING <<==================                        12.6   5.73  )-----------( 290      ( 19 Jul 2019 05:45 )             39.5        07-19    140  |  106  |  17  ----------------------------<  94  4.0   |  24  |  0.63    Ca    9.9      19 Jul 2019 05:45  Phos  3.5     07-19  Mg     1.9     07-19                 TSH:      15.02   (07-19-19)       ,     6.02   (07-17-19)           Lipid profile:  (07-17-19)     Total: 187     LDL  : 134     HDL  :45     TG   :129     HgA1C: 5.6  (07-17-19)          WBC count:   5.73 <<== ,  4.94 <<== ,  5.27 <<== ,  7.22 <<==   Hemoglobin:   12.6 <<==,  12.1 <<==,  12.0 <<==,  11.9 <<==  platelets:  290 <==, 286 <==, 284 <==, 300 <==    Creatinine:  0.63  <<==, 0.57  <<==, 0.56  <<==, 0.68  <<==  Sodium:   140  <==, 140  <==, 140  <==, 141  <==    < from: Cardiac Cath Lab - Adult (07.19.19 @ 12:29) >  CORONARY VESSELS: The coronary circulation is right dominant.  LM:   --  LM: Normal.  LAD:   --  LAD: Angiography showed minor luminal irregularities with no  flow limiting lesions.  CX:   --  Circumflex: Normal.  RCA:   --  RCA: Normal.  COMPLICATIONS: There were no complications.  DIAGNOSTIC IMPRESSIONS: Patient has dilated aotic root moderately severe  AI, ml to moderate globol LV dysfunction (LVEF=40-45%) normal right heart  pressures and normal coronaries  DIAGNOSTIC RECOMMENDATIONS: CT surgical consultation re:AVR and root replacement  < end of copied text >

## 2019-07-19 NOTE — DISCHARGE NOTE NURSING/CASE MANAGEMENT/SOCIAL WORK - NSDCDPATPORTLINK_GEN_ALL_CORE
You can access the [facility] Patient Portal, offered by [facility], by registering with the following website: [email or web address]

## 2019-07-19 NOTE — DISCHARGE NOTE PROVIDER - CARE PROVIDER_API CALL
Shar Andrade)  Internal Medicine  [address] [address] [place] [address]  Phone: [phone]  Fax: [phone]  Follow Up Time:     Isiah Mars)  Thoracic and Cardiac Surgery  [address] [place] [address]  Phone: [phone]  Fax: [phone]  Follow Up Time:

## 2019-07-19 NOTE — DISCHARGE NOTE PROVIDER - NSDCCPCAREPLAN_GEN_ALL_CORE_FT
PRINCIPAL DISCHARGE DIAGNOSIS  Diagnosis: Aortic insufficiency  Assessment and Plan of Treatment: Follow up with cardiothoracic surgeon Dr. Mars within 1 -2 weeks to discuss management of aortic regurgitation and aortopathy. Please call to arrange an appontment   Follow up with your Cardiologist Dr. Andrade for further monitoring in 1-2 weeks. Please call to arrange appointment. Ensure compliance with your medications. Low salt diet.      SECONDARY DISCHARGE DIAGNOSES  Diagnosis: Hypothyroidism  Assessment and Plan of Treatment: Continue with Synthroid daily. Follow up with your primary care physician for further monitoring in 1-2 weeks. Please call to arrange appointment.    Diagnosis: Breast cancer  Assessment and Plan of Treatment: Follow up with your oncologist per your routine. PRINCIPAL DISCHARGE DIAGNOSIS  Diagnosis: Aortic insufficiency  Assessment and Plan of Treatment: Follow up with cardiothoracic surgeon Dr. Mars within 1 -2 weeks to discuss management of aortic regurgitation and aortopathy. Please call to arrange an appontment   Follow up with your Cardiologist Dr. Andrade for further monitoring in 1-2 weeks. Please call to arrange appointment. Ensure compliance with your medications. Low salt diet.      SECONDARY DISCHARGE DIAGNOSES  Diagnosis: Moderate left ventricular systolic dysfunction  Assessment and Plan of Treatment: Follow up with your Cardiologist Dr. Andrade for further monitoring in 1-2 weeks. Please call to arrange appointment. Ensure compliance with your medications. Low salt diet.   Your cardiologist Dr. Andrade will discuss continuing Herceptin with your oncologist in setting of new left ventricular dysfunction.    Diagnosis: Hypothyroidism  Assessment and Plan of Treatment: Continue with Synthroid daily. Follow up with your primary care physician for further monitoring in 1-2 weeks. Please call to arrange appointment.    Diagnosis: Breast cancer  Assessment and Plan of Treatment: Follow up with your oncologist per your routine.

## 2019-07-19 NOTE — DISCHARGE NOTE NURSING/CASE MANAGEMENT/SOCIAL WORK - NSDCPEEMAIL_GEN_ALL_CORE
[facility] for Tobacco Control email [email or web address]

## 2019-07-19 NOTE — CHART NOTE - NSCHARTNOTEFT_GEN_A_CORE
Pt returned to 5N s/p cath, RF site dressing C/D/I, no evidence of active bleeding or hematoma. RF pulse 2+, R DP/PT 2+. Post cath instructions provided.

## 2019-07-19 NOTE — DISCHARGE NOTE NURSING/CASE MANAGEMENT/SOCIAL WORK - NSDCPEWEB_GEN_ALL_CORE
[facility] for Tobacco Control website --- [email or web address] --- [email or web address]

## 2019-07-19 NOTE — PROGRESS NOTE ADULT - SUBJECTIVE AND OBJECTIVE BOX
Cardiovascular Disease Progress Note    Overnight events: No acute events overnight.  s/p po mod lv dysfunction, severe AI. no new cardiac sx  Otherwise review of systems negative    Objective Findings:  T(C): 36.7 (07-19-19 @ 05:53), Max: 36.7 (07-18-19 @ 08:00)  HR: 94 (07-19-19 @ 05:53) (80 - 96)  BP: 114/68 (07-19-19 @ 05:53) (114/68 - 122/78)  RR: 18 (07-19-19 @ 05:53) (16 - 18)  SpO2: 98% (07-19-19 @ 05:53) (98% - 99%)  Wt(kg): --  Daily     Daily       Physical Exam:  Gen: NAD  HEENT: EOMI  CV: RRR, normal S1 + S2, no m/r/g  Lungs: CTAB  Abd: soft, non-tender  Ext: No edema    Telemetry: nsr    Laboratory Data:                        12.6   5.73  )-----------( 290      ( 19 Jul 2019 05:45 )             39.5     07-19    140  |  106  |  17  ----------------------------<  94  4.0   |  24  |  0.63    Ca    9.9      19 Jul 2019 05:45  Phos  3.5     07-19  Mg     1.9     07-19                Inpatient Medications:  MEDICATIONS  (STANDING):  heparin  Injectable 5000 Unit(s) SubCutaneous every 12 hours  levothyroxine 50 MICROGram(s) Oral daily  lisinopril 2.5 milliGRAM(s) Oral daily  metoprolol tartrate 25 milliGRAM(s) Oral two times a day  silver sulfADIAZINE 1% Cream 1 Application(s) Topical two times a day      Assessment:  -newly diagnoses severe LV dysfunction with moderate to severe AI  -hx of chemotherapy for breast cancer, currently on herceptin  -dyspnea prior to admission, currently resolved    Recs:  -c/w lisinopril 2.5mg daily for afterload reduction  -change metop to succinate 25mg daily  -hold MCA/aldactone for now. appears euvolemic and EF ~ 40%  -euvolemic off diuretics  -s/p po mod lv dysfunction, severe AI.  -discuss with OP oncologist regarding management of herceptin in setting of LV dysfunction  -plan for ischemic eval with LHC with Dr Puente today  -will defer ICD for now given EF ~ 40%  -dvt ppx  -will arrange OP f/u with Dr Mars to discuss surgical management of her aortic regurgitation and aortopathy       Over 25 minutes spent on total encounter; more than 50% of the visit was spent counseling and/or coordinating care by the attending physician.      Shar Andrade MD   Cardiovascular Disease  [phone]

## 2019-07-22 PROBLEM — C50.919 MALIGNANT NEOPLASM OF UNSPECIFIED SITE OF UNSPECIFIED FEMALE BREAST: Chronic | Status: ACTIVE | Noted: 2019-07-17

## 2019-07-22 PROBLEM — E03.9 HYPOTHYROIDISM, UNSPECIFIED: Chronic | Status: ACTIVE | Noted: 2019-07-17

## 2019-07-23 PROBLEM — Z00.00 ENCOUNTER FOR PREVENTIVE HEALTH EXAMINATION: Status: ACTIVE | Noted: 2019-07-23

## 2019-07-23 PROBLEM — Z71.9 ENCOUNTER FOR CONSULTATION: Status: ACTIVE | Noted: 2019-07-23

## 2019-07-24 ENCOUNTER — APPOINTMENT (OUTPATIENT)
Dept: CARDIOTHORACIC SURGERY | Facility: CLINIC | Age: 69
End: 2019-07-24
Payer: MEDICARE

## 2019-07-24 VITALS
TEMPERATURE: 98 F | DIASTOLIC BLOOD PRESSURE: 74 MMHG | SYSTOLIC BLOOD PRESSURE: 125 MMHG | HEART RATE: 91 BPM | RESPIRATION RATE: 12 BRPM | OXYGEN SATURATION: 98 %

## 2019-07-24 VITALS — HEIGHT: 59 IN | BODY MASS INDEX: 21.17 KG/M2 | WEIGHT: 105 LBS

## 2019-07-24 DIAGNOSIS — Z71.9 COUNSELING, UNSPECIFIED: ICD-10-CM

## 2019-07-24 DIAGNOSIS — E03.9 HYPOTHYROIDISM, UNSPECIFIED: ICD-10-CM

## 2019-07-24 DIAGNOSIS — Z87.891 PERSONAL HISTORY OF NICOTINE DEPENDENCE: ICD-10-CM

## 2019-07-24 DIAGNOSIS — I10 ESSENTIAL (PRIMARY) HYPERTENSION: ICD-10-CM

## 2019-07-24 PROCEDURE — 99203 OFFICE O/P NEW LOW 30 MIN: CPT
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
## 2019-07-24 NOTE — REASON FOR VISIT
[Initial Evaluation] : an initial evaluation [Spouse] : spouse [FreeTextEntry1] : dilated aortic root and moderate to severe AI

## 2019-07-24 NOTE — HISTORY OF PRESENT ILLNESS
[FreeTextEntry1] : Nataliia is a 68 y/o F with PMH of Breast cancer(s/p resection and chemo/RT and currently on Herceptin), Hypothyroidism presented from cardiology clinic for abnormal TTE. As per the patient she had intermittent SOB when she climbs a flight of stairs or when she is sitting up after laying down. Patient stated that she has gotten 28 sessions of radiation of after the first radiation treatment 3 weeks later she developed the SOB. Since patient was started on IV chemotherapy(has 3 more doses left every 3 weeks and last done was last week) she was told by her oncologist to go and see a cardiologist which she did yesterday and had a TTE and was told that she had decreased LV function and needed to come to the ER.

## 2019-07-24 NOTE — PHYSICAL EXAM
[General Appearance - Alert] : alert [General Appearance - In No Acute Distress] : in no acute distress [Sclera] : the sclera and conjunctiva were normal [PERRL With Normal Accommodation] : pupils were equal in size, round, and reactive to light [Outer Ear] : the ears and nose were normal in appearance [Oropharynx] : the oropharynx was normal [Extraocular Movements] : extraocular movements were intact [] : no respiratory distress [Jugular Venous Distention Increased] : there was no jugular-venous distention [Respiration, Rhythm And Depth] : normal respiratory rhythm and effort [Heart Rate And Rhythm] : heart rate was normal and rhythm regular [Apical Impulse] : the apical impulse was normal [Chest Visual Inspection Erythema] : erythema [Chest Palpation Tender Sternum] : tenderness [Abdomen Soft] : soft [Bowel Sounds] : normal bowel sounds [No CVA Tenderness] : no ~M costovertebral angle tenderness [Involuntary Movements] : no involuntary movements were seen [No Spinal Tenderness] : no spinal tenderness [Erythema] : erythema [No Focal Deficits] : no focal deficits [Impaired Insight] : insight and judgment were intact [Oriented To Time, Place, And Person] : oriented to person, place, and time [Right Carotid Bruit] : no bruit heard over the right carotid [Left Carotid Bruit] : no bruit heard over the left carotid [FreeTextEntry1] : deferred

## 2019-07-24 NOTE — DATA REVIEWED
[FreeTextEntry1] : 7/18: PASQUALE: 1. Mitral annular calcification, otherwise normal mitral valve. Mild mitral regurgitation.\par 2. Calcified trileaflet aortic valve with normal opening and central malcoaptation. Severe aortic regurgitation. Vena contracta width about  0.7-0.8 cm.\par 3. Normal aortic root (about  3.6 cm at the sinuses of Valsalva).  Moderate dilatation of the ascending aorta (about  4.4 cm).  Normal aortic arch and descending thoracic aorta.\par 4. Normal left atrium.  No left atrium or left atrial appendage thrombus.\par 5. Moderate global left ventricular systolic dysfunction.\par 6. Normal right ventricular size and function.\par 7. Contrast injection demonstrates no evidence of a patent foramen ovale.\par \par On 7/19, Pt underwent cardiac catheterization which revealed dilated aortic root moderately severe AI, mild to moderate global LV dysfunction (LVEF=40-45%) normal right heart pressures and normal coronaries \par
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
## 2019-09-25 ENCOUNTER — APPOINTMENT (OUTPATIENT)
Dept: CARDIOTHORACIC SURGERY | Facility: CLINIC | Age: 69
End: 2019-09-25
Payer: MEDICARE

## 2019-09-25 VITALS — SYSTOLIC BLOOD PRESSURE: 152 MMHG | DIASTOLIC BLOOD PRESSURE: 80 MMHG

## 2019-09-25 VITALS
HEIGHT: 59 IN | HEART RATE: 102 BPM | BODY MASS INDEX: 21.17 KG/M2 | TEMPERATURE: 98 F | WEIGHT: 105 LBS | RESPIRATION RATE: 12 BRPM | DIASTOLIC BLOOD PRESSURE: 81 MMHG | OXYGEN SATURATION: 99 % | SYSTOLIC BLOOD PRESSURE: 163 MMHG

## 2019-09-25 DIAGNOSIS — C50.919 MALIGNANT NEOPLASM OF UNSPECIFIED SITE OF UNSPECIFIED FEMALE BREAST: ICD-10-CM

## 2019-09-25 PROCEDURE — 99212 OFFICE O/P EST SF 10 MIN: CPT

## 2019-09-25 RX ORDER — LETROZOLE TABLETS 2.5 MG/1
2.5 TABLET, FILM COATED ORAL DAILY
Refills: 0 | Status: ACTIVE | COMMUNITY
Start: 2019-09-25
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
## 2019-09-25 NOTE — PHYSICAL EXAM
[Sclera] : the sclera and conjunctiva were normal [PERRL With Normal Accommodation] : pupils were equal in size, round, and reactive to light [Extraocular Movements] : extraocular movements were intact [Jugular Venous Distention Increased] : there was no jugular-venous distention [] : no respiratory distress [Respiration, Rhythm And Depth] : normal respiratory rhythm and effort [Heart Rate And Rhythm] : heart rate was normal and rhythm regular [Chest Visual Inspection Erythema] : erythema [Chest Palpation Tender Sternum] : tenderness [Breast Appearance] : normal in appearance [Bowel Sounds] : normal bowel sounds [Abdomen Soft] : soft [Cervical Lymph Nodes Enlarged Posterior Bilaterally] : posterior cervical [No CVA Tenderness] : no ~M costovertebral angle tenderness [Involuntary Movements] : no involuntary movements were seen [Erythema] : erythema [Skin Lesions 1] : Skin lesion: [No Focal Deficits] : no focal deficits [Oriented To Time, Place, And Person] : oriented to person, place, and time [Impaired Insight] : insight and judgment were intact [Right Carotid Bruit] : no bruit heard over the right carotid [Left Carotid Bruit] : no bruit heard over the left carotid [FreeTextEntry1] : deferred

## 2019-10-28 PROBLEM — Z92.3 S/P RADIATION THERAPY: Status: RESOLVED | Noted: 2019-10-28 | Resolved: 2019-10-28

## 2019-10-29 PROBLEM — I35.1 MODERATE AORTIC INSUFFICIENCY: Status: ACTIVE | Noted: 2019-07-24

## 2019-10-29 PROBLEM — I77.810 DILATED AORTIC ROOT: Status: ACTIVE | Noted: 2019-07-24

## 2019-10-30 ENCOUNTER — APPOINTMENT (OUTPATIENT)
Dept: CARDIOTHORACIC SURGERY | Facility: CLINIC | Age: 69
End: 2019-10-30
Payer: MEDICARE

## 2019-10-30 ENCOUNTER — APPOINTMENT (OUTPATIENT)
Dept: CV DIAGNOSITCS | Facility: HOSPITAL | Age: 69
End: 2019-10-30

## 2019-10-30 ENCOUNTER — OUTPATIENT (OUTPATIENT)
Dept: OUTPATIENT SERVICES | Facility: HOSPITAL | Age: 69
LOS: 1 days | End: 2019-10-30
Payer: MEDICARE

## 2019-10-30 VITALS
OXYGEN SATURATION: 99 % | HEART RATE: 92 BPM | DIASTOLIC BLOOD PRESSURE: 76 MMHG | RESPIRATION RATE: 13 BRPM | SYSTOLIC BLOOD PRESSURE: 121 MMHG

## 2019-10-30 VITALS — BODY MASS INDEX: 21.17 KG/M2 | HEIGHT: 59 IN | WEIGHT: 105 LBS

## 2019-10-30 DIAGNOSIS — I35.1 NONRHEUMATIC AORTIC (VALVE) INSUFFICIENCY: ICD-10-CM

## 2019-10-30 DIAGNOSIS — I77.810 THORACIC AORTIC ECTASIA: ICD-10-CM

## 2019-10-30 DIAGNOSIS — Z92.3 PERSONAL HISTORY OF IRRADIATION: ICD-10-CM

## 2019-10-30 DIAGNOSIS — I25.10 ATHEROSCLEROTIC HEART DISEASE OF NATIVE CORONARY ARTERY WITHOUT ANGINA PECTORIS: ICD-10-CM

## 2019-10-30 DIAGNOSIS — Z98.890 OTHER SPECIFIED POSTPROCEDURAL STATES: Chronic | ICD-10-CM

## 2019-10-30 PROCEDURE — 99212 OFFICE O/P EST SF 10 MIN: CPT

## 2019-10-30 PROCEDURE — 93306 TTE W/DOPPLER COMPLETE: CPT

## 2019-10-30 PROCEDURE — 93306 TTE W/DOPPLER COMPLETE: CPT | Mod: 26

## 2019-10-30 NOTE — PHYSICAL EXAM
[Sclera] : the sclera and conjunctiva were normal [PERRL With Normal Accommodation] : pupils were equal in size, round, and reactive to light [Extraocular Movements] : extraocular movements were intact [Neck Appearance] : the appearance of the neck was normal [Neck Cervical Mass (___cm)] : no neck mass was observed [Jugular Venous Distention Increased] : there was no jugular-venous distention [Thyroid Diffuse Enlargement] : the thyroid was not enlarged [Thyroid Nodule] : there were no palpable thyroid nodules [] : no respiratory distress [Auscultation Breath Sounds / Voice Sounds] : lungs were clear to auscultation bilaterally [Heart Rate And Rhythm] : heart rate was normal and rhythm regular [Diastolic Grade ___/4] : A grade [unfilled]/4 diastolic murmur was heard. [Examination Of The Chest] : the chest was normal in appearance [Edema] : there was no peripheral edema [Veins - Varicosity Changes] : there were no varicosital changes [Breast Appearance] : normal in appearance [Bowel Sounds] : normal bowel sounds [Abdomen Soft] : soft [Cervical Lymph Nodes Enlarged Posterior Bilaterally] : posterior cervical [Cervical Lymph Nodes Enlarged Anterior Bilaterally] : anterior cervical [No CVA Tenderness] : no ~M costovertebral angle tenderness [Abnormal Walk] : normal gait [Skin Color & Pigmentation] : normal skin color and pigmentation [No Focal Deficits] : no focal deficits [Oriented To Time, Place, And Person] : oriented to person, place, and time [FreeTextEntry1] : deferred

## 2019-10-30 NOTE — HISTORY OF PRESENT ILLNESS
[FreeTextEntry1] : Nataliia is a 70 y/o F with PMH of Breast cancer(s/p resection and chemo/RT and currently on Herceptin), Hypothyroidism and moderate to severe AI, mildly decreased LV function on TTE. On PASQUALE she has a mod dilated root but it appears larger on cath.  She has normal Coronaries.  She was seen in consultation at the end of July. On physical exam she had a noticeable burn s/p radiation therapy. She was following up with her hematologist and a CTA was recommended. She returns today to discuss the results of recent imaging (echo) and reevaluation of her anterior chest. She denies chest pain, palpitations, SOB, PND, or syncope. Her chest rash has now resolved after evaluation and treatment by her dermatologist.

## 2019-10-30 NOTE — ASSESSMENT
[FreeTextEntry1] : Nataliia is a 70 y/o F with PMH of Breast cancer(s/p resection and chemo/RT and currently on Herceptin), Hypothyroidism and moderate to severe AI, mildly decreased LV function on TTE. On PASQUALE she has a mod dilated root but it appears larger on cath.  She has normal Coronaries.  She was seen in consultation at the end of July. On physical exam she had a noticeable burn s/p radiation therapy. She was following up with her hematologist and a CTA was recommended. She returns today to discuss the results of recent imaging. Cardiac imaging, medical records and reports reviewed at length with patient. Bicuspid AV with moderate to severe AI, dilated (4.5 cm) ascending aorta. Her anterior chest rash has since resolved after evaluation by her dermatologist. \par \par Cardiac imaging, medical records and reports reviewed at length with patient. Risks, benefits and alternatives to surgery discussed. Risks included but not limited to bleeding, stroke, myocardial Infarction, kidney problems, blood transfusion, permanent pacemaker implantation, infections and death. Operative mortality and complication risks are low. Various approaches discussed in detail. The patient will benefit and is a candidate for aortic valve and ascending aorta replacement. All questions have been fully answered and concerns addressed. Patient would like to proceed with surgery.\par \par Plan:\par 1) Surgery scheduled for Wednesday December 4th\par 2) PSTs\par \par  \par \par

## 2019-10-30 NOTE — CONSULT LETTER
[FreeTextEntry1] : Her chest wall burn has resolved.  She has been cleared for surgery by her dermatologist.  We are scheduling her surgery for after Thanksgiving.   [FreeTextEntry3] : Isiah Mars MD, FACS\par Attending Surgeon \par Cardiovascular & Thoracic Surgery\par  \par [facility] [employer]\par

## 2019-11-26 ENCOUNTER — OUTPATIENT (OUTPATIENT)
Dept: OUTPATIENT SERVICES | Facility: HOSPITAL | Age: 69
LOS: 1 days | End: 2019-11-26
Payer: MEDICARE

## 2019-11-26 VITALS
RESPIRATION RATE: 18 BRPM | TEMPERATURE: 98 F | DIASTOLIC BLOOD PRESSURE: 82 MMHG | OXYGEN SATURATION: 99 % | WEIGHT: 110.89 LBS | SYSTOLIC BLOOD PRESSURE: 154 MMHG | HEART RATE: 98 BPM | HEIGHT: 58 IN

## 2019-11-26 DIAGNOSIS — Z01.818 ENCOUNTER FOR OTHER PREPROCEDURAL EXAMINATION: ICD-10-CM

## 2019-11-26 DIAGNOSIS — E03.9 HYPOTHYROIDISM, UNSPECIFIED: ICD-10-CM

## 2019-11-26 DIAGNOSIS — I71.2 THORACIC AORTIC ANEURYSM, WITHOUT RUPTURE: ICD-10-CM

## 2019-11-26 DIAGNOSIS — Z98.890 OTHER SPECIFIED POSTPROCEDURAL STATES: Chronic | ICD-10-CM

## 2019-11-26 DIAGNOSIS — I35.0 NONRHEUMATIC AORTIC (VALVE) STENOSIS: ICD-10-CM

## 2019-11-26 DIAGNOSIS — Z29.9 ENCOUNTER FOR PROPHYLACTIC MEASURES, UNSPECIFIED: ICD-10-CM

## 2019-11-26 DIAGNOSIS — I10 ESSENTIAL (PRIMARY) HYPERTENSION: ICD-10-CM

## 2019-11-26 DIAGNOSIS — Z98.41 CATARACT EXTRACTION STATUS, RIGHT EYE: Chronic | ICD-10-CM

## 2019-11-26 LAB
ALBUMIN SERPL ELPH-MCNC: 4.5 G/DL — SIGNIFICANT CHANGE UP (ref 3.3–5)
ALP SERPL-CCNC: 60 U/L — SIGNIFICANT CHANGE UP (ref 40–120)
ALT FLD-CCNC: 14 U/L — SIGNIFICANT CHANGE UP (ref 10–45)
ANION GAP SERPL CALC-SCNC: 14 MMOL/L — SIGNIFICANT CHANGE UP (ref 5–17)
AST SERPL-CCNC: 21 U/L — SIGNIFICANT CHANGE UP (ref 10–40)
BILIRUB SERPL-MCNC: 0.8 MG/DL — SIGNIFICANT CHANGE UP (ref 0.2–1.2)
BLD GP AB SCN SERPL QL: NEGATIVE — SIGNIFICANT CHANGE UP
BUN SERPL-MCNC: 16 MG/DL — SIGNIFICANT CHANGE UP (ref 7–23)
CALCIUM SERPL-MCNC: 9.9 MG/DL — SIGNIFICANT CHANGE UP (ref 8.4–10.5)
CHLORIDE SERPL-SCNC: 103 MMOL/L — SIGNIFICANT CHANGE UP (ref 96–108)
CO2 SERPL-SCNC: 24 MMOL/L — SIGNIFICANT CHANGE UP (ref 22–31)
CREAT SERPL-MCNC: 0.71 MG/DL — SIGNIFICANT CHANGE UP (ref 0.5–1.3)
GLUCOSE SERPL-MCNC: 94 MG/DL — SIGNIFICANT CHANGE UP (ref 70–99)
HBA1C BLD-MCNC: 5.6 % — SIGNIFICANT CHANGE UP (ref 4–5.6)
HCT VFR BLD CALC: 40.1 % — SIGNIFICANT CHANGE UP (ref 34.5–45)
HGB BLD-MCNC: 12.9 G/DL — SIGNIFICANT CHANGE UP (ref 11.5–15.5)
MCHC RBC-ENTMCNC: 32.1 PG — SIGNIFICANT CHANGE UP (ref 27–34)
MCHC RBC-ENTMCNC: 32.2 GM/DL — SIGNIFICANT CHANGE UP (ref 32–36)
MCV RBC AUTO: 99.8 FL — SIGNIFICANT CHANGE UP (ref 80–100)
MRSA PCR RESULT.: SIGNIFICANT CHANGE UP
PLATELET # BLD AUTO: 270 K/UL — SIGNIFICANT CHANGE UP (ref 150–400)
POTASSIUM SERPL-MCNC: 4.6 MMOL/L — SIGNIFICANT CHANGE UP (ref 3.5–5.3)
POTASSIUM SERPL-SCNC: 4.6 MMOL/L — SIGNIFICANT CHANGE UP (ref 3.5–5.3)
PROT SERPL-MCNC: 7.9 G/DL — SIGNIFICANT CHANGE UP (ref 6–8.3)
RBC # BLD: 4.02 M/UL — SIGNIFICANT CHANGE UP (ref 3.8–5.2)
RBC # FLD: 13.8 % — SIGNIFICANT CHANGE UP (ref 10.3–14.5)
RH IG SCN BLD-IMP: POSITIVE — SIGNIFICANT CHANGE UP
S AUREUS DNA NOSE QL NAA+PROBE: DETECTED
SODIUM SERPL-SCNC: 141 MMOL/L — SIGNIFICANT CHANGE UP (ref 135–145)
WBC # BLD: 4.15 K/UL — SIGNIFICANT CHANGE UP (ref 3.8–10.5)
WBC # FLD AUTO: 4.15 K/UL — SIGNIFICANT CHANGE UP (ref 3.8–10.5)

## 2019-11-26 PROCEDURE — 86901 BLOOD TYPING SEROLOGIC RH(D): CPT

## 2019-11-26 PROCEDURE — 80053 COMPREHEN METABOLIC PANEL: CPT

## 2019-11-26 PROCEDURE — 87641 MR-STAPH DNA AMP PROBE: CPT

## 2019-11-26 PROCEDURE — 86900 BLOOD TYPING SEROLOGIC ABO: CPT

## 2019-11-26 PROCEDURE — 85027 COMPLETE CBC AUTOMATED: CPT

## 2019-11-26 PROCEDURE — 86850 RBC ANTIBODY SCREEN: CPT

## 2019-11-26 PROCEDURE — 71046 X-RAY EXAM CHEST 2 VIEWS: CPT | Mod: 26

## 2019-11-26 PROCEDURE — 71046 X-RAY EXAM CHEST 2 VIEWS: CPT

## 2019-11-26 PROCEDURE — G0463: CPT

## 2019-11-26 PROCEDURE — 87640 STAPH A DNA AMP PROBE: CPT

## 2019-11-26 PROCEDURE — 83036 HEMOGLOBIN GLYCOSYLATED A1C: CPT

## 2019-11-26 RX ORDER — CEFUROXIME AXETIL 250 MG
1500 TABLET ORAL ONCE
Refills: 0 | Status: COMPLETED | OUTPATIENT
Start: 2019-12-04 | End: 2019-12-04

## 2019-11-26 NOTE — H&P PST ADULT - NSICDXPROBLEM_GEN_ALL_CORE_FT
PROBLEM DIAGNOSES  Problem: Hypertension  Assessment and Plan: Pt to continue oral medication as prescribed.    Problem: Hypothyroidism  Assessment and Plan: Pt followed by PCP. To continue oral medication as prescribed.     Problem: Nonrheumatic aortic valve stenosis  Assessment and Plan: Pt scheduled for sx on 12/4/2019. Surgical, chlorhexidine and surgical instructions reviewed w/ pt and spouse.     Problem: Need for prophylactic measure  Assessment and Plan: The Caprini score indicates that this patient is at high risk for a VTE event (score 6 or greater). Surgical patients in this group will benefit from both pharmacologic prophylaxis and intermittent compression devices.  The surgical team will determine the balance between VTE risk and bleeding risk, and other clinical considerations

## 2019-11-26 NOTE — H&P PST ADULT - NSICDXPASTSURGICALHX_GEN_ALL_CORE_FT
PAST SURGICAL HISTORY:  H/O bilateral cataract extraction 2018    History of vascular access device s/p mediport placement 12/18 and removal ~10/2019    S/P breast lumpectomy left sided 4/2019

## 2019-11-26 NOTE — H&P PST ADULT - ASSESSMENT
CAPRINI SCORE [CLOT updated 18]    AGE RELATED RISK FACTORS                                                       MOBILITY RELATED FACTORS  [ ] Age 41-60 years                                            (1 Point)                    [ ] Bed rest                                                        (1 Point)  [ ] Age: 61-74 years                                           (2 Points)                  [ ] Plaster cast                                                   (2 Points)  [ ] Age= 75 years                                              (3 Points)                    [ ] Bed bound for more than 72 hours                 (2 Points)    DISEASE RELATED RISK FACTORS                                               GENDER SPECIFIC FACTORS  [ ] Edema in the lower extremities                       (1 Point)              [ ] Pregnancy                                                     (1 Point)  [ ] Varicose veins                                               (1 Point)                     [ ] Post-partum < 6 weeks                                   (1 Point)             [ ] BMI > 25 Kg/m2                                            (1 Point)                     [ ] Hormonal therapy  or oral contraception          (1 Point)                 [ ] Sepsis (in the previous month)                        (1 Point)               [ ] History of pregnancy complications                 (1 point)  [ ] Pneumonia or serious lung disease                                               [ ] Unexplained or recurrent                     (1 Point)           (in the previous month)                               (1 Point)  [ ] Abnormal pulmonary function test                     (1 Point)                 SURGERY RELATED RISK FACTORS  [ ] Acute myocardial infarction                              (1 Point)               [ ]  Section                                             (1 Point)  [ ] Congestive heart failure (in the previous month)  (1 Point)      [ ] Minor surgery                                                  (1 Point)   [ ] Inflammatory bowel disease                             (1 Point)               [ ] Arthroscopic surgery                                        (2 Points)  [ ] Central venous access                                      (2 Points)                [ ] General surgery lasting more than 45 minutes (2 points)  [ ] Present or previous malignancy                     (2 Points)                [ ] Elective arthroplasty                                         (5 points)    [ ] Stroke (in the previous month)                          (5 Points)                                                                                                                                                           HEMATOLOGY RELATED FACTORS                                                 TRAUMA RELATED RISK FACTORS  [ ] Prior episodes of VTE                                     (3 Points)                [ ] Fracture of the hip, pelvis, or leg                       (5 Points)  [ ] Positive family history for VTE                         (3 Points)             [ ] Acute spinal cord injury (in the previous month)  (5 Points)  [ ] Prothrombin 70446 A                                     (3 Points)               [ ] Paralysis  (less than 1 month)                             (5 Points)  [ ] Factor V Leiden                                             (3 Points)                  [ ] Multiple Trauma within 1 month                        (5 Points)  [ ] Lupus anticoagulants                                     (3 Points)                                                           [ ] Anticardiolipin antibodies                               (3 Points)                                                       [ ] High homocysteine in the blood                      (3 Points)                                             [ ] Other congenital or acquired thrombophilia      (3 Points)                                                [ ] Heparin induced thrombocytopenia                  (3 Points)                                     Total Score [ 7  ]

## 2019-11-26 NOTE — H&P PST ADULT - NSICDXPASTMEDICALHX_GEN_ALL_CORE_FT
PAST MEDICAL HISTORY:  Breast cancer left side s/p lumpectomy, chemotherapy and radiation    Former smoker >30 yr smoker; quit ~1 yr ago 2018    Hyperlipidemia     Hypertension     Hypothyroidism     Nonrheumatic aortic valve stenosis     Thoracic aortic aneurysm without rupture

## 2019-11-26 NOTE — H&P PST ADULT - HISTORY OF PRESENT ILLNESS
69yr old female presents to PST for an Aortic Valve Replacement and Ascending Aneurysm Repair on 12/4/2019. Pt w/ PMH of HTN, HLD, Hypothyroidism and Breast CA (left breast- s/p resection 4/19 w/ chemo/RT and currently on Letrozole). Pt was sent to cardiologist for workup at time of chemo; dx w/ moderate to severe AI w/ mildly decreased LV function on TTE w/ normal coronaries. Cardiac imaging showed bicuspid AV w/ moderate to severe AI w/ dilated (4.5cm) ascending aorta. Pt states she has been asymptomatic; denies chest pain, SOB or edema and feels great otherwise.

## 2019-12-03 ENCOUNTER — TRANSCRIPTION ENCOUNTER (OUTPATIENT)
Age: 69
End: 2019-12-03

## 2019-12-04 ENCOUNTER — INPATIENT (INPATIENT)
Facility: HOSPITAL | Age: 69
LOS: 3 days | Discharge: HOME | DRG: 219 | End: 2019-12-08
Attending: THORACIC SURGERY (CARDIOTHORACIC VASCULAR SURGERY) | Admitting: THORACIC SURGERY (CARDIOTHORACIC VASCULAR SURGERY)
Payer: MEDICARE

## 2019-12-04 ENCOUNTER — APPOINTMENT (OUTPATIENT)
Dept: CARDIOTHORACIC SURGERY | Facility: HOSPITAL | Age: 69
End: 2019-12-04

## 2019-12-04 ENCOUNTER — RESULT REVIEW (OUTPATIENT)
Age: 69
End: 2019-12-04

## 2019-12-04 VITALS
RESPIRATION RATE: 18 BRPM | HEIGHT: 58 IN | OXYGEN SATURATION: 100 % | SYSTOLIC BLOOD PRESSURE: 156 MMHG | HEART RATE: 84 BPM | DIASTOLIC BLOOD PRESSURE: 80 MMHG | TEMPERATURE: 98 F | WEIGHT: 80.47 LBS

## 2019-12-04 DIAGNOSIS — I35.1 NONRHEUMATIC AORTIC (VALVE) INSUFFICIENCY: ICD-10-CM

## 2019-12-04 DIAGNOSIS — Z98.890 OTHER SPECIFIED POSTPROCEDURAL STATES: Chronic | ICD-10-CM

## 2019-12-04 DIAGNOSIS — I71.2 THORACIC AORTIC ANEURYSM, WITHOUT RUPTURE: ICD-10-CM

## 2019-12-04 DIAGNOSIS — I35.0 NONRHEUMATIC AORTIC (VALVE) STENOSIS: ICD-10-CM

## 2019-12-04 DIAGNOSIS — Z98.41 CATARACT EXTRACTION STATUS, RIGHT EYE: Chronic | ICD-10-CM

## 2019-12-04 PROBLEM — E78.5 HYPERLIPIDEMIA, UNSPECIFIED: Chronic | Status: ACTIVE | Noted: 2019-11-26

## 2019-12-04 PROBLEM — Z87.891 PERSONAL HISTORY OF NICOTINE DEPENDENCE: Chronic | Status: ACTIVE | Noted: 2019-11-26

## 2019-12-04 PROBLEM — I10 ESSENTIAL (PRIMARY) HYPERTENSION: Chronic | Status: ACTIVE | Noted: 2019-11-26

## 2019-12-04 LAB
ALBUMIN SERPL ELPH-MCNC: 2.5 G/DL — LOW (ref 3.3–5)
ALP SERPL-CCNC: 34 U/L — LOW (ref 40–120)
ALT FLD-CCNC: 10 U/L — SIGNIFICANT CHANGE UP (ref 10–45)
ANION GAP SERPL CALC-SCNC: 13 MMOL/L — SIGNIFICANT CHANGE UP (ref 5–17)
APTT BLD: 36.1 SEC — SIGNIFICANT CHANGE UP (ref 27.5–36.3)
AST SERPL-CCNC: 23 U/L — SIGNIFICANT CHANGE UP (ref 10–40)
BASE EXCESS BLDV CALC-SCNC: -0.6 MMOL/L — SIGNIFICANT CHANGE UP (ref -2–2)
BASE EXCESS BLDV CALC-SCNC: -0.9 MMOL/L — SIGNIFICANT CHANGE UP (ref -2–2)
BASE EXCESS BLDV CALC-SCNC: -3.8 MMOL/L — LOW (ref -2–2)
BASE EXCESS BLDV CALC-SCNC: 1 MMOL/L — SIGNIFICANT CHANGE UP (ref -2–2)
BASE EXCESS BLDV CALC-SCNC: 1.7 MMOL/L — SIGNIFICANT CHANGE UP (ref -2–2)
BASOPHILS # BLD AUTO: 0.02 K/UL — SIGNIFICANT CHANGE UP (ref 0–0.2)
BASOPHILS NFR BLD AUTO: 0.2 % — SIGNIFICANT CHANGE UP (ref 0–2)
BILIRUB SERPL-MCNC: 1 MG/DL — SIGNIFICANT CHANGE UP (ref 0.2–1.2)
BLOOD GAS VENOUS - CREATININE: SIGNIFICANT CHANGE UP MG/DL (ref 0.5–1.3)
BUN SERPL-MCNC: 9 MG/DL — SIGNIFICANT CHANGE UP (ref 7–23)
CA-I SERPL-SCNC: 0.86 MMOL/L — LOW (ref 1.12–1.3)
CA-I SERPL-SCNC: 0.87 MMOL/L — LOW (ref 1.12–1.3)
CA-I SERPL-SCNC: 0.89 MMOL/L — LOW (ref 1.12–1.3)
CA-I SERPL-SCNC: 0.92 MMOL/L — LOW (ref 1.12–1.3)
CA-I SERPL-SCNC: 1.17 MMOL/L — SIGNIFICANT CHANGE UP (ref 1.12–1.3)
CALCIUM SERPL-MCNC: 8.8 MG/DL — SIGNIFICANT CHANGE UP (ref 8.4–10.5)
CHLORIDE BLDV-SCNC: 108 MMOL/L — SIGNIFICANT CHANGE UP (ref 96–108)
CHLORIDE BLDV-SCNC: SIGNIFICANT CHANGE UP MMOL/L (ref 96–108)
CHLORIDE SERPL-SCNC: 109 MMOL/L — HIGH (ref 96–108)
CK MB BLD-MCNC: 9.2 % — HIGH (ref 0–3.5)
CK MB CFR SERPL CALC: 20.1 NG/ML — HIGH (ref 0–3.8)
CK SERPL-CCNC: 218 U/L — HIGH (ref 25–170)
CO2 BLDV-SCNC: 26 MMOL/L — SIGNIFICANT CHANGE UP (ref 22–30)
CO2 SERPL-SCNC: 24 MMOL/L — SIGNIFICANT CHANGE UP (ref 22–31)
CREAT SERPL-MCNC: 0.5 MG/DL — SIGNIFICANT CHANGE UP (ref 0.5–1.3)
EOSINOPHIL # BLD AUTO: 0.02 K/UL — SIGNIFICANT CHANGE UP (ref 0–0.5)
EOSINOPHIL NFR BLD AUTO: 0.2 % — SIGNIFICANT CHANGE UP (ref 0–6)
FIBRINOGEN PPP-MCNC: 210 MG/DL — LOW (ref 350–510)
GAS PNL BLDA: SIGNIFICANT CHANGE UP
GAS PNL BLDV: 137 MMOL/L — SIGNIFICANT CHANGE UP (ref 135–145)
GAS PNL BLDV: 139 MMOL/L — SIGNIFICANT CHANGE UP (ref 135–145)
GAS PNL BLDV: 139 MMOL/L — SIGNIFICANT CHANGE UP (ref 135–145)
GAS PNL BLDV: 140 MMOL/L — SIGNIFICANT CHANGE UP (ref 135–145)
GAS PNL BLDV: 144 MMOL/L — SIGNIFICANT CHANGE UP (ref 135–145)
GAS PNL BLDV: SIGNIFICANT CHANGE UP
GLUCOSE BLDC GLUCOMTR-MCNC: 122 MG/DL — HIGH (ref 70–99)
GLUCOSE BLDC GLUCOMTR-MCNC: 154 MG/DL — HIGH (ref 70–99)
GLUCOSE BLDC GLUCOMTR-MCNC: 95 MG/DL — SIGNIFICANT CHANGE UP (ref 70–99)
GLUCOSE BLDV-MCNC: 127 MG/DL — HIGH (ref 70–99)
GLUCOSE BLDV-MCNC: 149 MG/DL — HIGH (ref 70–99)
GLUCOSE BLDV-MCNC: 161 MG/DL — HIGH (ref 70–99)
GLUCOSE BLDV-MCNC: 164 MG/DL — HIGH (ref 70–99)
GLUCOSE BLDV-MCNC: 176 MG/DL — HIGH (ref 70–99)
GLUCOSE SERPL-MCNC: 127 MG/DL — HIGH (ref 70–99)
HCO3 BLDV-SCNC: 21 MMOL/L — SIGNIFICANT CHANGE UP (ref 21–29)
HCO3 BLDV-SCNC: 24 MMOL/L — SIGNIFICANT CHANGE UP (ref 21–29)
HCO3 BLDV-SCNC: 25 MMOL/L — SIGNIFICANT CHANGE UP (ref 21–29)
HCO3 BLDV-SCNC: 26 MMOL/L — SIGNIFICANT CHANGE UP (ref 21–29)
HCO3 BLDV-SCNC: 27 MMOL/L — SIGNIFICANT CHANGE UP (ref 21–29)
HCT VFR BLD CALC: 25.5 % — LOW (ref 34.5–45)
HCT VFR BLDA CALC: 23 % — LOW (ref 39–50)
HCT VFR BLDA CALC: 24 % — LOW (ref 39–50)
HCT VFR BLDA CALC: 26 % — LOW (ref 39–50)
HCT VFR BLDA CALC: 27 % — LOW (ref 39–50)
HCT VFR BLDA CALC: 32 % — LOW (ref 39–50)
HGB BLD CALC-MCNC: 10.3 G/DL — LOW (ref 11.5–15.5)
HGB BLD CALC-MCNC: 7.4 G/DL — LOW (ref 11.5–15.5)
HGB BLD CALC-MCNC: 7.6 G/DL — LOW (ref 11.5–15.5)
HGB BLD CALC-MCNC: 8.2 G/DL — LOW (ref 11.5–15.5)
HGB BLD CALC-MCNC: 8.6 G/DL — LOW (ref 11.5–15.5)
HGB BLD-MCNC: 9 G/DL — LOW (ref 11.5–15.5)
HOROWITZ INDEX BLDV+IHG-RTO: 0 — SIGNIFICANT CHANGE UP
HOROWITZ INDEX BLDV+IHG-RTO: 40 — SIGNIFICANT CHANGE UP
IMM GRANULOCYTES NFR BLD AUTO: 2.2 % — HIGH (ref 0–1.5)
INR BLD: 1.69 RATIO — HIGH (ref 0.88–1.16)
LACTATE BLDV-MCNC: 0.9 MMOL/L — SIGNIFICANT CHANGE UP (ref 0.7–2)
LACTATE BLDV-MCNC: 1.1 MMOL/L — SIGNIFICANT CHANGE UP (ref 0.7–2)
LACTATE BLDV-MCNC: 1.1 MMOL/L — SIGNIFICANT CHANGE UP (ref 0.7–2)
LACTATE BLDV-MCNC: 1.3 MMOL/L — SIGNIFICANT CHANGE UP (ref 0.7–2)
LACTATE BLDV-MCNC: 1.5 MMOL/L — SIGNIFICANT CHANGE UP (ref 0.7–2)
LYMPHOCYTES # BLD AUTO: 0.85 K/UL — LOW (ref 1–3.3)
LYMPHOCYTES # BLD AUTO: 6.5 % — LOW (ref 13–44)
MAGNESIUM SERPL-MCNC: 3.1 MG/DL — HIGH (ref 1.6–2.6)
MCHC RBC-ENTMCNC: 33.1 PG — SIGNIFICANT CHANGE UP (ref 27–34)
MCHC RBC-ENTMCNC: 35.3 GM/DL — SIGNIFICANT CHANGE UP (ref 32–36)
MCV RBC AUTO: 93.8 FL — SIGNIFICANT CHANGE UP (ref 80–100)
MONOCYTES # BLD AUTO: 0.71 K/UL — SIGNIFICANT CHANGE UP (ref 0–0.9)
MONOCYTES NFR BLD AUTO: 5.4 % — SIGNIFICANT CHANGE UP (ref 2–14)
NEUTROPHILS # BLD AUTO: 11.26 K/UL — HIGH (ref 1.8–7.4)
NEUTROPHILS NFR BLD AUTO: 85.5 % — HIGH (ref 43–77)
NRBC # BLD: 0 /100 WBCS — SIGNIFICANT CHANGE UP (ref 0–0)
OTHER CELLS CSF MANUAL: 10 ML/DL — LOW (ref 18–22)
PCO2 BLDV: 41 MMHG — SIGNIFICANT CHANGE UP (ref 35–50)
PCO2 BLDV: 42 MMHG — SIGNIFICANT CHANGE UP (ref 35–50)
PCO2 BLDV: 45 MMHG — SIGNIFICANT CHANGE UP (ref 35–50)
PCO2 BLDV: 47 MMHG — SIGNIFICANT CHANGE UP (ref 35–50)
PCO2 BLDV: 47 MMHG — SIGNIFICANT CHANGE UP (ref 35–50)
PH BLDV: 7.33 — LOW (ref 7.35–7.45)
PH BLDV: 7.34 — LOW (ref 7.35–7.45)
PH BLDV: 7.37 — SIGNIFICANT CHANGE UP (ref 7.35–7.45)
PH BLDV: 7.38 — SIGNIFICANT CHANGE UP (ref 7.35–7.45)
PH BLDV: 7.38 — SIGNIFICANT CHANGE UP (ref 7.35–7.45)
PHOSPHATE SERPL-MCNC: 2.4 MG/DL — LOW (ref 2.5–4.5)
PLATELET # BLD AUTO: 114 K/UL — LOW (ref 150–400)
PO2 BLDV: 41 MMHG — SIGNIFICANT CHANGE UP (ref 25–45)
PO2 BLDV: 42 MMHG — SIGNIFICANT CHANGE UP (ref 25–45)
PO2 BLDV: 63 MMHG — HIGH (ref 25–45)
PO2 BLDV: 64 MMHG — HIGH (ref 25–45)
PO2 BLDV: 74 MMHG — HIGH (ref 25–45)
POTASSIUM BLDV-SCNC: 4 MMOL/L — SIGNIFICANT CHANGE UP (ref 3.5–5.3)
POTASSIUM BLDV-SCNC: 4.9 MMOL/L — SIGNIFICANT CHANGE UP (ref 3.5–5)
POTASSIUM BLDV-SCNC: 5.3 MMOL/L — HIGH (ref 3.5–5)
POTASSIUM BLDV-SCNC: 5.7 MMOL/L — HIGH (ref 3.5–5)
POTASSIUM BLDV-SCNC: 5.8 MMOL/L — HIGH (ref 3.5–5)
POTASSIUM SERPL-MCNC: 4.1 MMOL/L — SIGNIFICANT CHANGE UP (ref 3.5–5.3)
POTASSIUM SERPL-SCNC: 4.1 MMOL/L — SIGNIFICANT CHANGE UP (ref 3.5–5.3)
PROT SERPL-MCNC: 4.3 G/DL — LOW (ref 6–8.3)
PROTHROM AB SERPL-ACNC: 19.8 SEC — HIGH (ref 10–12.9)
RBC # BLD: 2.72 M/UL — LOW (ref 3.8–5.2)
RBC # FLD: 14.4 % — SIGNIFICANT CHANGE UP (ref 10.3–14.5)
RH IG SCN BLD-IMP: POSITIVE — SIGNIFICANT CHANGE UP
SAO2 % BLDV: 72 % — SIGNIFICANT CHANGE UP (ref 67–88)
SAO2 % BLDV: 77 % — SIGNIFICANT CHANGE UP (ref 67–88)
SAO2 % BLDV: 91 % — HIGH (ref 67–88)
SAO2 % BLDV: 92 % — HIGH (ref 67–88)
SAO2 % BLDV: 95 % — HIGH (ref 67–88)
SODIUM SERPL-SCNC: 146 MMOL/L — HIGH (ref 135–145)
TROPONIN T, HIGH SENSITIVITY RESULT: 342 NG/L — HIGH (ref 0–51)
WBC # BLD: 13.15 K/UL — HIGH (ref 3.8–10.5)
WBC # FLD AUTO: 13.15 K/UL — HIGH (ref 3.8–10.5)

## 2019-12-04 PROCEDURE — 99291 CRITICAL CARE FIRST HOUR: CPT

## 2019-12-04 PROCEDURE — 93010 ELECTROCARDIOGRAM REPORT: CPT

## 2019-12-04 PROCEDURE — 71045 X-RAY EXAM CHEST 1 VIEW: CPT | Mod: 26

## 2019-12-04 PROCEDURE — 88305 TISSUE EXAM BY PATHOLOGIST: CPT | Mod: 26

## 2019-12-04 PROCEDURE — 33405 REPLACEMENT AORTIC VALVE OPN: CPT

## 2019-12-04 PROCEDURE — 33860: CPT

## 2019-12-04 RX ORDER — DEXMEDETOMIDINE HYDROCHLORIDE IN 0.9% SODIUM CHLORIDE 4 UG/ML
0.3 INJECTION INTRAVENOUS
Qty: 200 | Refills: 0 | Status: DISCONTINUED | OUTPATIENT
Start: 2019-12-04 | End: 2019-12-04

## 2019-12-04 RX ORDER — CEFUROXIME AXETIL 250 MG
1500 TABLET ORAL EVERY 8 HOURS
Refills: 0 | Status: COMPLETED | OUTPATIENT
Start: 2019-12-04 | End: 2019-12-06

## 2019-12-04 RX ORDER — SODIUM CHLORIDE 9 MG/ML
250 INJECTION, SOLUTION INTRAVENOUS ONCE
Refills: 0 | Status: COMPLETED | OUTPATIENT
Start: 2019-12-04 | End: 2019-12-04

## 2019-12-04 RX ORDER — CHLORHEXIDINE GLUCONATE 213 G/1000ML
1 SOLUTION TOPICAL
Refills: 0 | Status: ACTIVE | OUTPATIENT
Start: 2019-12-04 | End: 2020-11-01

## 2019-12-04 RX ORDER — INSULIN HUMAN 100 [IU]/ML
3 INJECTION, SOLUTION SUBCUTANEOUS
Qty: 100 | Refills: 0 | Status: DISCONTINUED | OUTPATIENT
Start: 2019-12-04 | End: 2019-12-05

## 2019-12-04 RX ORDER — POTASSIUM CHLORIDE 20 MEQ
10 PACKET (EA) ORAL
Refills: 0 | Status: DISCONTINUED | OUTPATIENT
Start: 2019-12-04 | End: 2019-12-06

## 2019-12-04 RX ORDER — FENTANYL CITRATE 50 UG/ML
25 INJECTION INTRAVENOUS ONCE
Refills: 0 | Status: DISCONTINUED | OUTPATIENT
Start: 2019-12-04 | End: 2019-12-04

## 2019-12-04 RX ORDER — LEVOTHYROXINE SODIUM 125 MCG
25 TABLET ORAL AT BEDTIME
Refills: 0 | Status: DISCONTINUED | OUTPATIENT
Start: 2019-12-04 | End: 2019-12-04

## 2019-12-04 RX ORDER — SODIUM CHLORIDE 9 MG/ML
3 INJECTION INTRAMUSCULAR; INTRAVENOUS; SUBCUTANEOUS EVERY 8 HOURS
Refills: 0 | Status: DISCONTINUED | OUTPATIENT
Start: 2019-12-04 | End: 2019-12-04

## 2019-12-04 RX ORDER — POTASSIUM CHLORIDE 20 MEQ
10 PACKET (EA) ORAL
Refills: 0 | Status: COMPLETED | OUTPATIENT
Start: 2019-12-04 | End: 2019-12-04

## 2019-12-04 RX ORDER — ALBUMIN HUMAN 25 %
250 VIAL (ML) INTRAVENOUS ONCE
Refills: 0 | Status: COMPLETED | OUTPATIENT
Start: 2019-12-04 | End: 2019-12-04

## 2019-12-04 RX ORDER — POLYETHYLENE GLYCOL 3350 17 G/17G
17 POWDER, FOR SOLUTION ORAL DAILY
Refills: 0 | Status: ACTIVE | OUTPATIENT
Start: 2019-12-04 | End: 2020-11-01

## 2019-12-04 RX ORDER — NICARDIPINE HYDROCHLORIDE 30 MG/1
5 CAPSULE, EXTENDED RELEASE ORAL
Qty: 40 | Refills: 0 | Status: DISCONTINUED | OUTPATIENT
Start: 2019-12-04 | End: 2019-12-05

## 2019-12-04 RX ORDER — LIDOCAINE HCL 20 MG/ML
0.2 VIAL (ML) INJECTION ONCE
Refills: 0 | Status: DISCONTINUED | OUTPATIENT
Start: 2019-12-04 | End: 2019-12-04

## 2019-12-04 RX ORDER — CHLORHEXIDINE GLUCONATE 213 G/1000ML
1 SOLUTION TOPICAL ONCE
Refills: 0 | Status: DISCONTINUED | OUTPATIENT
Start: 2019-12-04 | End: 2019-12-04

## 2019-12-04 RX ORDER — FAMOTIDINE 10 MG/ML
20 INJECTION INTRAVENOUS DAILY
Refills: 0 | Status: DISCONTINUED | OUTPATIENT
Start: 2019-12-04 | End: 2019-12-04

## 2019-12-04 RX ORDER — POTASSIUM CHLORIDE 20 MEQ
10 PACKET (EA) ORAL
Refills: 0 | Status: COMPLETED | OUTPATIENT
Start: 2019-12-04 | End: 2019-12-05

## 2019-12-04 RX ORDER — ONDANSETRON 8 MG/1
4 TABLET, FILM COATED ORAL ONCE
Refills: 0 | Status: COMPLETED | OUTPATIENT
Start: 2019-12-04 | End: 2019-12-04

## 2019-12-04 RX ORDER — LEVOTHYROXINE SODIUM 125 MCG
50 TABLET ORAL
Refills: 0 | Status: ACTIVE | OUTPATIENT
Start: 2019-12-04 | End: 2020-11-01

## 2019-12-04 RX ORDER — ACETAMINOPHEN 500 MG
1000 TABLET ORAL ONCE
Refills: 0 | Status: COMPLETED | OUTPATIENT
Start: 2019-12-04 | End: 2019-12-04

## 2019-12-04 RX ORDER — CHLORHEXIDINE GLUCONATE 213 G/1000ML
15 SOLUTION TOPICAL EVERY 12 HOURS
Refills: 0 | Status: DISCONTINUED | OUTPATIENT
Start: 2019-12-04 | End: 2019-12-04

## 2019-12-04 RX ORDER — DEXTROSE 50 % IN WATER 50 %
50 SYRINGE (ML) INTRAVENOUS
Refills: 0 | Status: ACTIVE | OUTPATIENT
Start: 2019-12-04

## 2019-12-04 RX ORDER — FAMOTIDINE 10 MG/ML
20 INJECTION INTRAVENOUS DAILY
Refills: 0 | Status: ACTIVE | OUTPATIENT
Start: 2019-12-04 | End: 2020-11-01

## 2019-12-04 RX ORDER — NOREPINEPHRINE BITARTRATE/D5W 8 MG/250ML
0.04 PLASTIC BAG, INJECTION (ML) INTRAVENOUS
Qty: 8 | Refills: 0 | Status: DISCONTINUED | OUTPATIENT
Start: 2019-12-04 | End: 2019-12-05

## 2019-12-04 RX ORDER — LETROZOLE 2.5 MG/1
1 TABLET, FILM COATED ORAL
Qty: 0 | Refills: 0 | DISCHARGE

## 2019-12-04 RX ORDER — DEXTROSE 50 % IN WATER 50 %
25 SYRINGE (ML) INTRAVENOUS
Refills: 0 | Status: ACTIVE | OUTPATIENT
Start: 2019-12-04

## 2019-12-04 RX ORDER — MEPERIDINE HYDROCHLORIDE 50 MG/ML
25 INJECTION INTRAMUSCULAR; INTRAVENOUS; SUBCUTANEOUS ONCE
Refills: 0 | Status: DISCONTINUED | OUTPATIENT
Start: 2019-12-04 | End: 2019-12-04

## 2019-12-04 RX ORDER — LEVOTHYROXINE SODIUM 125 MCG
1 TABLET ORAL
Qty: 0 | Refills: 0 | DISCHARGE

## 2019-12-04 RX ORDER — INFLUENZA VIRUS VACCINE 15; 15; 15; 15 UG/.5ML; UG/.5ML; UG/.5ML; UG/.5ML
0.5 SUSPENSION INTRAMUSCULAR ONCE
Refills: 0 | Status: DISCONTINUED | OUTPATIENT
Start: 2019-12-04 | End: 2019-12-04

## 2019-12-04 RX ORDER — MAGNESIUM SULFATE 500 MG/ML
2 VIAL (ML) INJECTION ONCE
Refills: 0 | Status: COMPLETED | OUTPATIENT
Start: 2019-12-04 | End: 2019-12-04

## 2019-12-04 RX ORDER — SENNA PLUS 8.6 MG/1
2 TABLET ORAL AT BEDTIME
Refills: 0 | Status: ACTIVE | OUTPATIENT
Start: 2019-12-05 | End: 2020-11-02

## 2019-12-04 RX ORDER — DOBUTAMINE HCL 250MG/20ML
2 VIAL (ML) INTRAVENOUS
Qty: 500 | Refills: 0 | Status: DISCONTINUED | OUTPATIENT
Start: 2019-12-04 | End: 2019-12-04

## 2019-12-04 RX ORDER — CHLORHEXIDINE GLUCONATE 213 G/1000ML
5 SOLUTION TOPICAL EVERY 4 HOURS
Refills: 0 | Status: DISCONTINUED | OUTPATIENT
Start: 2019-12-04 | End: 2019-12-04

## 2019-12-04 RX ORDER — SODIUM CHLORIDE 9 MG/ML
1000 INJECTION INTRAMUSCULAR; INTRAVENOUS; SUBCUTANEOUS
Refills: 0 | Status: ACTIVE | OUTPATIENT
Start: 2019-12-04 | End: 2020-11-01

## 2019-12-04 RX ORDER — DOBUTAMINE HCL 250MG/20ML
1 VIAL (ML) INTRAVENOUS
Qty: 500 | Refills: 0 | Status: DISCONTINUED | OUTPATIENT
Start: 2019-12-04 | End: 2019-12-06

## 2019-12-04 RX ADMIN — ONDANSETRON 4 MILLIGRAM(S): 8 TABLET, FILM COATED ORAL at 21:06

## 2019-12-04 RX ADMIN — SODIUM CHLORIDE 1500 MILLILITER(S): 9 INJECTION, SOLUTION INTRAVENOUS at 14:24

## 2019-12-04 RX ADMIN — CHLORHEXIDINE GLUCONATE 15 MILLILITER(S): 213 SOLUTION TOPICAL at 17:24

## 2019-12-04 RX ADMIN — Medication 50 GRAM(S): at 22:35

## 2019-12-04 RX ADMIN — Medication 125 MILLILITER(S): at 21:07

## 2019-12-04 RX ADMIN — FENTANYL CITRATE 25 MICROGRAM(S): 50 INJECTION INTRAVENOUS at 15:15

## 2019-12-04 RX ADMIN — NICARDIPINE HYDROCHLORIDE 25 MG/HR: 30 CAPSULE, EXTENDED RELEASE ORAL at 14:43

## 2019-12-04 RX ADMIN — FAMOTIDINE 20 MILLIGRAM(S): 10 INJECTION INTRAVENOUS at 14:44

## 2019-12-04 RX ADMIN — CHLORHEXIDINE GLUCONATE 5 MILLILITER(S): 213 SOLUTION TOPICAL at 14:44

## 2019-12-04 RX ADMIN — Medication 2.74 MICROGRAM(S)/KG/MIN: at 14:33

## 2019-12-04 RX ADMIN — Medication 100 MILLIEQUIVALENT(S): at 15:00

## 2019-12-04 RX ADMIN — Medication 400 MILLIGRAM(S): at 20:47

## 2019-12-04 RX ADMIN — Medication 1000 MILLIGRAM(S): at 22:15

## 2019-12-04 RX ADMIN — Medication 125 MILLILITER(S): at 17:24

## 2019-12-04 RX ADMIN — DEXMEDETOMIDINE HYDROCHLORIDE IN 0.9% SODIUM CHLORIDE 2.74 MICROGRAM(S)/KG/HR: 4 INJECTION INTRAVENOUS at 15:34

## 2019-12-04 RX ADMIN — SODIUM CHLORIDE 1500 MILLILITER(S): 9 INJECTION, SOLUTION INTRAVENOUS at 15:00

## 2019-12-04 RX ADMIN — Medication 100 MILLIEQUIVALENT(S): at 14:20

## 2019-12-04 RX ADMIN — Medication 100 MILLIGRAM(S): at 16:25

## 2019-12-04 RX ADMIN — FENTANYL CITRATE 25 MICROGRAM(S): 50 INJECTION INTRAVENOUS at 14:43

## 2019-12-04 RX ADMIN — Medication 125 MILLILITER(S): at 14:21

## 2019-12-04 RX ADMIN — Medication 50 MILLIEQUIVALENT(S): at 22:35

## 2019-12-04 RX ADMIN — SODIUM CHLORIDE 250 MILLILITER(S): 9 INJECTION, SOLUTION INTRAVENOUS at 21:07

## 2019-12-04 NOTE — BRIEF OPERATIVE NOTE - NSICDXBRIEFPREOP_GEN_ALL_CORE_FT
PRE-OP DIAGNOSIS:  Aortic regurgitation 04-Dec-2019 13:27:27  Michael Recinos  Ascending aortic aneurysm 04-Dec-2019 13:27:21  Michael Recinos

## 2019-12-04 NOTE — BRIEF OPERATIVE NOTE - NSICDXBRIEFPOSTOP_GEN_ALL_CORE_FT
POST-OP DIAGNOSIS:  Ascending aortic aneurysm 04-Dec-2019 13:27:40  Michael Recinos  Aortic regurgitation 04-Dec-2019 13:27:34  Michael Recinos

## 2019-12-04 NOTE — PRE-ANESTHESIA EVALUATION ADULT - NSANTHPMHFT_GEN_ALL_CORE
69yr old female presents to PST for an Aortic Valve Replacement and Ascending Aneurysm Repair on 12/4/2019. Pt w/ PMH of HTN, HLD, Hypothyroidism and Breast CA (left breast- s/p resection 4/19 w/ chemo/RT and currently on Letrozole)

## 2019-12-04 NOTE — BRIEF OPERATIVE NOTE - NSICDXBRIEFPROCEDURE_GEN_ALL_CORE_FT
PROCEDURES:  Porcine aortic valve replacement 04-Dec-2019 13:27:10  Michael Recinos  Ascending aortic aneurysm repair 04-Dec-2019 13:27:04  Michael Recinos

## 2019-12-04 NOTE — AIRWAY REMOVAL NOTE  ADULT & PEDS - ARTIFICAL AIRWAY REMOVAL COMMENTS
Written order for extubation verified. The patient was identified by full name and birth date compared to the identification band. Present during the procedure was  mera denney.

## 2019-12-04 NOTE — PROGRESS NOTE ADULT - SUBJECTIVE AND OBJECTIVE BOX
CRITICAL CARE ATTENDING - CTICU    MEDICATIONS  (STANDING):  cefuroxime  IVPB 1500 milliGRAM(s) IV Intermittent every 8 hours  chlorhexidine 0.12% Liquid 15 milliLiter(s) Oral Mucosa every 12 hours  chlorhexidine 0.12% Liquid 5 milliLiter(s) Oral Mucosa every 4 hours  chlorhexidine 2% Cloths 1 Application(s) Topical <User Schedule>  dexMEDEtomidine Infusion 0.3 MICROgram(s)/kG/Hr (2.737 mL/Hr) IV Continuous <Continuous>  dextrose 50% Injectable 50 milliLiter(s) IV Push every 15 minutes  dextrose 50% Injectable 25 milliLiter(s) IV Push every 15 minutes  DOBUTamine Infusion 2.5 MICROgram(s)/kG/Min (2.737 mL/Hr) IV Continuous <Continuous>  famotidine Injectable 20 milliGRAM(s) IV Push daily  insulin regular Infusion 3 Unit(s)/Hr (3 mL/Hr) IV Continuous <Continuous>  levothyroxine Injectable 25 MICROGram(s) IV Push at bedtime  meperidine     Injectable 25 milliGRAM(s) IV Push once  niCARdipine Infusion 5 mG/Hr (25 mL/Hr) IV Continuous <Continuous>  norepinephrine Infusion 0.043 MICROgram(s)/kG/Min (2.943 mL/Hr) IV Continuous <Continuous>  polyethylene glycol 3350 17 Gram(s) Oral daily  potassium chloride  10 mEq/50 mL IVPB 10 milliEquivalent(s) IV Intermittent every 1 hour  potassium chloride  10 mEq/50 mL IVPB 10 milliEquivalent(s) IV Intermittent every 1 hour  potassium chloride  10 mEq/50 mL IVPB 10 milliEquivalent(s) IV Intermittent every 1 hour  potassium chloride  10 mEq/50 mL IVPB 10 milliEquivalent(s) IV Intermittent every 1 hour  sodium chloride 0.9%. 1000 milliLiter(s) (10 mL/Hr) IV Continuous <Continuous>                                    9.0    13.15 )-----------( 114      ( 04 Dec 2019 13:42 )             25.5       12-04    146<H>  |  109<H>  |  9   ----------------------------<  127<H>  4.1   |  24  |  0.50    Ca    8.8      04 Dec 2019 13:42  Phos  2.4     12-04  Mg     3.1     12-04    TPro  4.3<L>  /  Alb  2.5<L>  /  TBili  1.0  /  DBili  x   /  AST  23  /  ALT  10  /  AlkPhos  34<L>  12-04      PT/INR - ( 04 Dec 2019 13:42 )   PT: 19.8 sec;   INR: 1.69 ratio         PTT - ( 04 Dec 2019 13:42 )  PTT:36.1 sec    Mode: AC/ CMV (Assist Control/ Continuous Mandatory Ventilation)  RR (machine): 14  TV (machine): 350  FiO2: 50  PEEP: 5  ITime: 1  MAP: 10  PIP: 26      Daily Height in cm: 147.32 (04 Dec 2019 11:14)    Daily       12-04 @ 07:01  -  12-04 @ 14:56  --------------------------------------------------------  IN: 1538 mL / OUT: 400 mL / NET: 1138 mL        Critically Ill patient  : [ ] preoperative ,   [ x] post operative    Requires :  [ x] Arterial Line   [x ] Central Line  [ ] PA catheter  [ ] IABP  [ ] ECMO  [ ] LVAD  [ x] Ventilator  [x ] pacemaker [ ] Impella.                      [x ] ABG's     [x ] Pulse Oxymetry Monitoring  Bedside evaluation , monitoring , treatment of hemodynamics , fluids , IVP/ IVCD meds.        Diagnosis:     Op day - AVR / Ascending Aortic Repair     Hypotension a/w Hypertension, requiring intravenous medication.     Hemodynamic lability,  instability. Requires IVCD [x ] vasopressors [x ] inotropes  [x ] vasodilator  [ x]IVSS fluid  to maintain MAP, perfusion, C.I.     ECG     Chest tube Drainage     Ventilator Management:  [ x]AC-rest    [ ]CPAP-PS Wean    [ ]Trach Collar     [ ]Extubate    [ ] T-Piece  [ ]peep>5     Requires chest PT, pulmonary toilet, ambu bagging, suctioning to maintain SaO2,  patent airway and treat atelectasis.     Requires IVCD sedation / analgesia to manage post op hemodynamics     Thrombocytopenia     Hypernatremia     Temporary pacemaker (TPM) interrogation and setting.                         Discussed with CT surgeon, Physician's Assistant - Nurse Practitioner- Critical care medicine team.   Dicussed at  AM / PM rounds.   Chart, labs , films reviewed.    Total Time: 30 min

## 2019-12-04 NOTE — BRIEF OPERATIVE NOTE - OPERATION/FINDINGS
Root-sparing ascending aortic aneurysm repair with aortic valve replacement (porcine).  Cross clamp time 101 minutes

## 2019-12-05 LAB
ALBUMIN SERPL ELPH-MCNC: 4 G/DL — SIGNIFICANT CHANGE UP (ref 3.3–5)
ALP SERPL-CCNC: 33 U/L — LOW (ref 40–120)
ALT FLD-CCNC: 13 U/L — SIGNIFICANT CHANGE UP (ref 10–45)
ANION GAP SERPL CALC-SCNC: 11 MMOL/L — SIGNIFICANT CHANGE UP (ref 5–17)
APTT BLD: 27.7 SEC — SIGNIFICANT CHANGE UP (ref 27.5–36.3)
AST SERPL-CCNC: 36 U/L — SIGNIFICANT CHANGE UP (ref 10–40)
BASE EXCESS BLDV CALC-SCNC: -3.5 MMOL/L — LOW (ref -2–2)
BASE EXCESS BLDV CALC-SCNC: 0.7 MMOL/L — SIGNIFICANT CHANGE UP (ref -2–2)
BILIRUB SERPL-MCNC: 1.8 MG/DL — HIGH (ref 0.2–1.2)
BUN SERPL-MCNC: 10 MG/DL — SIGNIFICANT CHANGE UP (ref 7–23)
CALCIUM SERPL-MCNC: 8.8 MG/DL — SIGNIFICANT CHANGE UP (ref 8.4–10.5)
CHLORIDE SERPL-SCNC: 105 MMOL/L — SIGNIFICANT CHANGE UP (ref 96–108)
CO2 BLDV-SCNC: 23 MMOL/L — SIGNIFICANT CHANGE UP (ref 22–30)
CO2 BLDV-SCNC: 27 MMOL/L — SIGNIFICANT CHANGE UP (ref 22–30)
CO2 SERPL-SCNC: 23 MMOL/L — SIGNIFICANT CHANGE UP (ref 22–31)
CREAT SERPL-MCNC: 0.66 MG/DL — SIGNIFICANT CHANGE UP (ref 0.5–1.3)
GAS PNL BLDA: SIGNIFICANT CHANGE UP
GAS PNL BLDV: SIGNIFICANT CHANGE UP
GAS PNL BLDV: SIGNIFICANT CHANGE UP
GLUCOSE BLDC GLUCOMTR-MCNC: 106 MG/DL — HIGH (ref 70–99)
GLUCOSE BLDC GLUCOMTR-MCNC: 114 MG/DL — HIGH (ref 70–99)
GLUCOSE BLDC GLUCOMTR-MCNC: 122 MG/DL — HIGH (ref 70–99)
GLUCOSE BLDC GLUCOMTR-MCNC: 131 MG/DL — HIGH (ref 70–99)
GLUCOSE BLDC GLUCOMTR-MCNC: 160 MG/DL — HIGH (ref 70–99)
GLUCOSE BLDC GLUCOMTR-MCNC: 174 MG/DL — HIGH (ref 70–99)
GLUCOSE BLDC GLUCOMTR-MCNC: 90 MG/DL — SIGNIFICANT CHANGE UP (ref 70–99)
GLUCOSE SERPL-MCNC: 117 MG/DL — HIGH (ref 70–99)
HCO3 BLDV-SCNC: 21 MMOL/L — SIGNIFICANT CHANGE UP (ref 21–29)
HCO3 BLDV-SCNC: 26 MMOL/L — SIGNIFICANT CHANGE UP (ref 21–29)
HCT VFR BLD CALC: 27.8 % — LOW (ref 34.5–45)
HGB BLD-MCNC: 9.6 G/DL — LOW (ref 11.5–15.5)
HOROWITZ INDEX BLDV+IHG-RTO: 28 — SIGNIFICANT CHANGE UP
HOROWITZ INDEX BLDV+IHG-RTO: 32 — SIGNIFICANT CHANGE UP
INR BLD: 1.24 RATIO — HIGH (ref 0.88–1.16)
LACTATE BLDV-MCNC: 2.5 MMOL/L — HIGH (ref 0.7–2)
MAGNESIUM SERPL-MCNC: 2.2 MG/DL — SIGNIFICANT CHANGE UP (ref 1.6–2.6)
MCHC RBC-ENTMCNC: 32.7 PG — SIGNIFICANT CHANGE UP (ref 27–34)
MCHC RBC-ENTMCNC: 34.5 GM/DL — SIGNIFICANT CHANGE UP (ref 32–36)
MCV RBC AUTO: 94.6 FL — SIGNIFICANT CHANGE UP (ref 80–100)
NRBC # BLD: 0 /100 WBCS — SIGNIFICANT CHANGE UP (ref 0–0)
PCO2 BLDV: 40 MMHG — SIGNIFICANT CHANGE UP (ref 35–50)
PCO2 BLDV: 47 MMHG — SIGNIFICANT CHANGE UP (ref 35–50)
PH BLDV: 7.34 — LOW (ref 7.35–7.45)
PH BLDV: 7.36 — SIGNIFICANT CHANGE UP (ref 7.35–7.45)
PHOSPHATE SERPL-MCNC: 2.7 MG/DL — SIGNIFICANT CHANGE UP (ref 2.5–4.5)
PLATELET # BLD AUTO: 123 K/UL — LOW (ref 150–400)
PO2 BLDV: 36 MMHG — SIGNIFICANT CHANGE UP (ref 25–45)
PO2 BLDV: 38 MMHG — SIGNIFICANT CHANGE UP (ref 25–45)
POTASSIUM SERPL-MCNC: 4.5 MMOL/L — SIGNIFICANT CHANGE UP (ref 3.5–5.3)
POTASSIUM SERPL-SCNC: 4.5 MMOL/L — SIGNIFICANT CHANGE UP (ref 3.5–5.3)
PROT SERPL-MCNC: 6.1 G/DL — SIGNIFICANT CHANGE UP (ref 6–8.3)
PROTHROM AB SERPL-ACNC: 14.4 SEC — HIGH (ref 10–12.9)
RBC # BLD: 2.94 M/UL — LOW (ref 3.8–5.2)
RBC # FLD: 14.8 % — HIGH (ref 10.3–14.5)
SAO2 % BLDV: 67 % — SIGNIFICANT CHANGE UP (ref 67–88)
SAO2 % BLDV: 70 % — SIGNIFICANT CHANGE UP (ref 67–88)
SODIUM SERPL-SCNC: 139 MMOL/L — SIGNIFICANT CHANGE UP (ref 135–145)
WBC # BLD: 10.66 K/UL — HIGH (ref 3.8–10.5)
WBC # FLD AUTO: 10.66 K/UL — HIGH (ref 3.8–10.5)

## 2019-12-05 PROCEDURE — 36620 INSERTION CATHETER ARTERY: CPT | Mod: 78

## 2019-12-05 PROCEDURE — 71045 X-RAY EXAM CHEST 1 VIEW: CPT | Mod: 26

## 2019-12-05 PROCEDURE — 99291 CRITICAL CARE FIRST HOUR: CPT

## 2019-12-05 PROCEDURE — 93010 ELECTROCARDIOGRAM REPORT: CPT

## 2019-12-05 RX ORDER — ALBUMIN HUMAN 25 %
250 VIAL (ML) INTRAVENOUS ONCE
Refills: 0 | Status: COMPLETED | OUTPATIENT
Start: 2019-12-05 | End: 2019-12-05

## 2019-12-05 RX ORDER — ACETAMINOPHEN 500 MG
650 TABLET ORAL EVERY 6 HOURS
Refills: 0 | Status: ACTIVE | OUTPATIENT
Start: 2019-12-05 | End: 2020-11-02

## 2019-12-05 RX ORDER — INSULIN LISPRO 100/ML
VIAL (ML) SUBCUTANEOUS
Refills: 0 | Status: DISCONTINUED | OUTPATIENT
Start: 2019-12-05 | End: 2019-12-06

## 2019-12-05 RX ORDER — OXYCODONE HYDROCHLORIDE 5 MG/1
10 TABLET ORAL EVERY 4 HOURS
Refills: 0 | Status: ACTIVE | OUTPATIENT
Start: 2019-12-05 | End: 2019-12-12

## 2019-12-05 RX ORDER — HEPARIN SODIUM 5000 [USP'U]/ML
5000 INJECTION INTRAVENOUS; SUBCUTANEOUS EVERY 8 HOURS
Refills: 0 | Status: ACTIVE | OUTPATIENT
Start: 2019-12-05 | End: 2020-11-02

## 2019-12-05 RX ORDER — INSULIN LISPRO 100/ML
VIAL (ML) SUBCUTANEOUS AT BEDTIME
Refills: 0 | Status: DISCONTINUED | OUTPATIENT
Start: 2019-12-05 | End: 2019-12-06

## 2019-12-05 RX ORDER — OXYCODONE HYDROCHLORIDE 5 MG/1
5 TABLET ORAL EVERY 4 HOURS
Refills: 0 | Status: ACTIVE | OUTPATIENT
Start: 2019-12-05 | End: 2019-12-12

## 2019-12-05 RX ORDER — ASPIRIN/CALCIUM CARB/MAGNESIUM 324 MG
81 TABLET ORAL DAILY
Refills: 0 | Status: ACTIVE | OUTPATIENT
Start: 2019-12-05 | End: 2020-11-02

## 2019-12-05 RX ORDER — GLUCAGON INJECTION, SOLUTION 0.5 MG/.1ML
1 INJECTION, SOLUTION SUBCUTANEOUS ONCE
Refills: 0 | Status: ACTIVE | OUTPATIENT
Start: 2019-12-05 | End: 2020-11-02

## 2019-12-05 RX ORDER — DEXTROSE 50 % IN WATER 50 %
15 SYRINGE (ML) INTRAVENOUS ONCE
Refills: 0 | Status: ACTIVE | OUTPATIENT
Start: 2019-12-05 | End: 2020-11-02

## 2019-12-05 RX ORDER — ATORVASTATIN CALCIUM 80 MG/1
40 TABLET, FILM COATED ORAL AT BEDTIME
Refills: 0 | Status: ACTIVE | OUTPATIENT
Start: 2019-12-05 | End: 2020-11-02

## 2019-12-05 RX ORDER — SODIUM CHLORIDE 9 MG/ML
1000 INJECTION, SOLUTION INTRAVENOUS
Refills: 0 | Status: ACTIVE | OUTPATIENT
Start: 2019-12-05 | End: 2020-11-02

## 2019-12-05 RX ADMIN — Medication 125 MILLILITER(S): at 02:30

## 2019-12-05 RX ADMIN — OXYCODONE HYDROCHLORIDE 10 MILLIGRAM(S): 5 TABLET ORAL at 12:55

## 2019-12-05 RX ADMIN — OXYCODONE HYDROCHLORIDE 10 MILLIGRAM(S): 5 TABLET ORAL at 04:30

## 2019-12-05 RX ADMIN — Medication 100 MILLIGRAM(S): at 07:46

## 2019-12-05 RX ADMIN — OXYCODONE HYDROCHLORIDE 5 MILLIGRAM(S): 5 TABLET ORAL at 22:03

## 2019-12-05 RX ADMIN — Medication 3 MICROGRAM(S)/KG/MIN: at 22:04

## 2019-12-05 RX ADMIN — Medication 500 MILLILITER(S): at 22:02

## 2019-12-05 RX ADMIN — OXYCODONE HYDROCHLORIDE 10 MILLIGRAM(S): 5 TABLET ORAL at 12:25

## 2019-12-05 RX ADMIN — Medication 100 MILLIGRAM(S): at 18:21

## 2019-12-05 RX ADMIN — OXYCODONE HYDROCHLORIDE 5 MILLIGRAM(S): 5 TABLET ORAL at 22:18

## 2019-12-05 RX ADMIN — OXYCODONE HYDROCHLORIDE 10 MILLIGRAM(S): 5 TABLET ORAL at 05:00

## 2019-12-05 RX ADMIN — Medication 81 MILLIGRAM(S): at 12:25

## 2019-12-05 RX ADMIN — FAMOTIDINE 20 MILLIGRAM(S): 10 INJECTION INTRAVENOUS at 12:25

## 2019-12-05 RX ADMIN — SENNA PLUS 2 TABLET(S): 8.6 TABLET ORAL at 22:03

## 2019-12-05 RX ADMIN — Medication 50 MILLIEQUIVALENT(S): at 00:11

## 2019-12-05 RX ADMIN — Medication 50 MICROGRAM(S): at 05:11

## 2019-12-05 RX ADMIN — HEPARIN SODIUM 5000 UNIT(S): 5000 INJECTION INTRAVENOUS; SUBCUTANEOUS at 16:16

## 2019-12-05 RX ADMIN — Medication 125 MILLILITER(S): at 03:08

## 2019-12-05 RX ADMIN — Medication 50 MILLIEQUIVALENT(S): at 01:00

## 2019-12-05 RX ADMIN — Medication 100 MILLIGRAM(S): at 00:12

## 2019-12-05 RX ADMIN — Medication 3 MICROGRAM(S)/KG/MIN: at 07:47

## 2019-12-05 RX ADMIN — CHLORHEXIDINE GLUCONATE 1 APPLICATION(S): 213 SOLUTION TOPICAL at 05:11

## 2019-12-05 RX ADMIN — HEPARIN SODIUM 5000 UNIT(S): 5000 INJECTION INTRAVENOUS; SUBCUTANEOUS at 22:04

## 2019-12-05 NOTE — PHYSICAL THERAPY INITIAL EVALUATION ADULT - TRANSFER TRAINING, PT EVAL
2. LTG: Pt will perform all transfers independently with appropriate assistive device within 3 weeks

## 2019-12-05 NOTE — PHYSICAL THERAPY INITIAL EVALUATION ADULT - PERTINENT HX OF CURRENT PROBLEM, REHAB EVAL
70 y/o F p/w with moderate to severe AI w/ decreased LV function on TTE w/ normal coronaries. Cardiac imaging showed bicuspid AV w/ moderate to severe AI w/ dilated ascending aorta. Currently s/p aortic valve replacement & ascending aneurysm repair on 12/4/2019. Pt w/ PMH of HTN, HLD, & breast CA (left breast- s/p resection 4/19 w/ chemo/RT and currently on Letrozole).

## 2019-12-05 NOTE — PHYSICAL THERAPY INITIAL EVALUATION ADULT - PLANNED THERAPY INTERVENTIONS, PT EVAL
transfer training/bed mobility training/gait training/Stairs: 4. LTG: pt will ascend/descend 10 steps independently within 3 weeks.

## 2019-12-05 NOTE — PROCEDURE NOTE - NSPROCDETAILS_GEN_ALL_CORE
positive blood return obtained via catheter/sutured in place/Seldinger technique/all materials/supplies accounted for at end of procedure/hemostasis with direct pressure, dressing applied/location identified, draped/prepped, sterile technique used, needle inserted/introduced

## 2019-12-05 NOTE — PHYSICAL THERAPY INITIAL EVALUATION ADULT - GENERAL OBSERVATIONS, REHAB EVAL
Pt received seated in chair in NAD with  at bedside, +chest tubes, +lynch, +2L of O2 via NC, +IV, +tele, +external pacer

## 2019-12-05 NOTE — PROGRESS NOTE ADULT - SUBJECTIVE AND OBJECTIVE BOX
CRITICAL CARE ATTENDING - CTICU    MEDICATIONS  (STANDING):  aspirin enteric coated 81 milliGRAM(s) Oral daily  cefuroxime  IVPB 1500 milliGRAM(s) IV Intermittent every 8 hours  chlorhexidine 2% Cloths 1 Application(s) Topical <User Schedule>  dextrose 5%. 1000 milliLiter(s) (50 mL/Hr) IV Continuous <Continuous>  dextrose 50% Injectable 50 milliLiter(s) IV Push every 15 minutes  dextrose 50% Injectable 25 milliLiter(s) IV Push every 15 minutes  DOBUTamine Infusion 2 MICROgram(s)/kG/Min (3 mL/Hr) IV Continuous <Continuous>  famotidine    Tablet 20 milliGRAM(s) Oral daily  insulin lispro (HumaLOG) corrective regimen sliding scale   SubCutaneous three times a day before meals  insulin lispro (HumaLOG) corrective regimen sliding scale   SubCutaneous at bedtime  levothyroxine 50 MICROGram(s) Oral <User Schedule>  polyethylene glycol 3350 17 Gram(s) Oral daily  senna 2 Tablet(s) Oral at bedtime  sodium chloride 0.9%. 1000 milliLiter(s) (10 mL/Hr) IV Continuous <Continuous>                          9.6    10.66 )-----------( 123      ( 05 Dec 2019 02:11 )             27.8   12-05    139  |  105  |  10  ----------------------------<  117<H>  4.5   |  23  |  0.66    Ca    8.8      05 Dec 2019 02:11  Phos  2.7     12-05  Mg     2.2     12-05    TPro  6.1  /  Alb  4.0  /  TBili  1.8<H>  /  DBili  x   /  AST  36  /  ALT  13  /  AlkPhos  33<L>  12-05    Critically Ill patient  : [ ] preoperative ,   [ x] post operative    Requires :  [ x] Arterial Line   [x ] Central Line  [ ] PA catheter  [ ] IABP  [ ] ECMO  [ ] LVAD  [ x] Ventilator  [x ] pacemaker [ ] Impella.                      [x ] ABG's     [x ] Pulse Oxymetry Monitoring  Bedside evaluation , monitoring , treatment of hemodynamics , fluids , IVP/ IVCD meds.    Diagnosis:     Op day - AVR / Ascending Aortic Repair     Hypotension a/w Hypertension, requiring intravenous medication.     Hemodynamic lability,  instability. Requires IVCD [x ] vasopressors [x ] inotropes  [x ] vasodilator  [ x]IVSS fluid  to maintain MAP, perfusion, C.I.     ECG     Chest tube Drainage     Ventilator Management:  [ x]AC-rest    [ ]CPAP-PS Wean    [ ]Trach Collar     [ ]Extubate    [ ] T-Piece  [ ]peep>5     Requires chest PT, pulmonary toilet, ambu bagging, suctioning to maintain SaO2,  patent airway and treat atelectasis.     Requires IVCD sedation / analgesia to manage post op hemodynamics     Thrombocytopenia     Hypernatremia     Temporary pacemaker (TPM) interrogation and setting.       Discussed with CT surgeon, Physician's Assistant - Nurse Practitioner- Critical care medicine team.   Discussed at  AM / PM rounds.   Chart, labs , films reviewed.    Total Time: 30 min CRITICAL CARE ATTENDING - CTICU    MEDICATIONS  (STANDING):  aspirin enteric coated 81 milliGRAM(s) Oral daily  cefuroxime  IVPB 1500 milliGRAM(s) IV Intermittent every 8 hours  chlorhexidine 2% Cloths 1 Application(s) Topical <User Schedule>  dextrose 5%. 1000 milliLiter(s) (50 mL/Hr) IV Continuous <Continuous>  dextrose 50% Injectable 50 milliLiter(s) IV Push every 15 minutes  dextrose 50% Injectable 25 milliLiter(s) IV Push every 15 minutes  DOBUTamine Infusion 2 MICROgram(s)/kG/Min (3 mL/Hr) IV Continuous <Continuous>  famotidine    Tablet 20 milliGRAM(s) Oral daily  insulin lispro (HumaLOG) corrective regimen sliding scale   SubCutaneous three times a day before meals  insulin lispro (HumaLOG) corrective regimen sliding scale   SubCutaneous at bedtime  levothyroxine 50 MICROGram(s) Oral <User Schedule>  polyethylene glycol 3350 17 Gram(s) Oral daily  senna 2 Tablet(s) Oral at bedtime  sodium chloride 0.9%. 1000 milliLiter(s) (10 mL/Hr) IV Continuous <Continuous>                          9.6    10.66 )-----------( 123      ( 05 Dec 2019 02:11 )             27.8   12-05    139  |  105  |  10  ----------------------------<  117<H>  4.5   |  23  |  0.66    Ca    8.8      05 Dec 2019 02:11  Phos  2.7     12-05  Mg     2.2     12-05    TPro  6.1  /  Alb  4.0  /  TBili  1.8<H>  /  DBili  x   /  AST  36  /  ALT  13  /  AlkPhos  33<L>  12-05    Critically Ill patient  : [ ] preoperative ,   [ x] post operative    Requires :  [ x] Arterial Line   [x ] Central Line  [ ] PA catheter  [ ] IABP  [ ] ECMO  [ ] LVAD  [ x] Ventilator  [x ] pacemaker [ ] Impella.                      [x ] ABG's     [x ] Pulse Oxymetry Monitoring  Bedside evaluation , monitoring , treatment of hemodynamics and fluids    Diagnosis:     Op day - AVR / Ascending Aortic Repair     Hypotension a/w Hypertension, requiring intravenous medication.     Hemodynamic lability,  instability. Requires IVCD [x ] vasopressors [x ] inotropes  [x ] vasodilator  [ x]IVSS fluid  to maintain MAP, perfusion, C.I.     Discussed with CT surgeon, Physician's Assistant - Nurse Practitioner- Critical care medicine team.   Discussed at  AM / PM rounds.   Chart, labs , films reviewed.    Total Time: 30 min

## 2019-12-05 NOTE — CONSULT NOTE ADULT - SUBJECTIVE AND OBJECTIVE BOX
CHIEF COMPLAINT: s/p avr    HISTORY OF PRESENT ILLNESS:  69yr old female presents to PST for an Aortic Valve Replacement and Ascending Aneurysm Repair on 2019. Pt w/ PMH of HTN, HLD, Hypothyroidism and Breast CA (left breast- s/p resection  w/ chemo/RT and currently on Letrozole). Pt was sent to cardiologist for workup at time of chemo; dx w/ moderate to severe AI w/ mildly decreased LV function on TTE w/ normal coronaries. Cardiac imaging showed bicuspid AV w/ moderate to severe AI w/ dilated (4.5cm) ascending aorta. Pt states she has been asymptomatic; denies chest pain, SOB or edema and feels great otherwise.   s/p porcine aortic valve replacement and ascending aortic aneurysm repair on 19. reports some post op nausea and surgical related pain. otherwise feels well        Allergies  No Known Allergies        MEDICATIONS:  aspirin enteric coated 81 milliGRAM(s) Oral daily  DOBUTamine Infusion 2 MICROgram(s)/kG/Min IV Continuous <Continuous>  cefuroxime  IVPB 1500 milliGRAM(s) IV Intermittent every 8 hours  acetaminophen   Tablet .. 650 milliGRAM(s) Oral every 6 hours PRN  oxyCODONE    IR 5 milliGRAM(s) Oral every 4 hours PRN  oxyCODONE    IR 10 milliGRAM(s) Oral every 4 hours PRN  famotidine    Tablet 20 milliGRAM(s) Oral daily  polyethylene glycol 3350 17 Gram(s) Oral daily  senna 2 Tablet(s) Oral at bedtime  dextrose 40% Gel 15 Gram(s) Oral once PRN  dextrose 50% Injectable 50 milliLiter(s) IV Push every 15 minutes  dextrose 50% Injectable 25 milliLiter(s) IV Push every 15 minutes  glucagon  Injectable 1 milliGRAM(s) IntraMuscular once PRN  insulin lispro (HumaLOG) corrective regimen sliding scale   SubCutaneous three times a day before meals  insulin lispro (HumaLOG) corrective regimen sliding scale   SubCutaneous at bedtime  levothyroxine 50 MICROGram(s) Oral <User Schedule>  chlorhexidine 2% Cloths 1 Application(s) Topical <User Schedule>  dextrose 5%. 1000 milliLiter(s) IV Continuous <Continuous>  potassium chloride  10 mEq/50 mL IVPB 10 milliEquivalent(s) IV Intermittent every 1 hour  potassium chloride  10 mEq/50 mL IVPB 10 milliEquivalent(s) IV Intermittent every 1 hour  potassium chloride  10 mEq/50 mL IVPB 10 milliEquivalent(s) IV Intermittent every 1 hour  sodium chloride 0.9%. 1000 milliLiter(s) IV Continuous <Continuous>      PAST MEDICAL & SURGICAL HISTORY:  Former smoker: &gt;30 yr smoker; quit ~1 yr ago   Thoracic aortic aneurysm without rupture  Nonrheumatic aortic valve stenosis  Hyperlipidemia  Hypertension  Hypothyroidism  Breast cancer: left side s/p lumpectomy, chemotherapy and radiation  History of vascular access device: s/p mediport placement  and removal ~10/2019  H/O bilateral cataract extraction: 2018  S/P breast lumpectomy: left sided 2019      FAMILY HISTORY:  No pertinent family history in first degree relatives      SOCIAL HISTORY:    non smoker. independent in adl      REVIEW OF SYSTEMS:  See HPI, otherwise complete 10 point review of systems negative    [ ] All others negative	    PHYSICAL EXAM:  T(C): 37.2 (19 @ 08:00), Max: 37.5 (19 @ 17:00)  HR: 78 (19 @ 08:00) (69 - 101)  BP: --  RR: 18 (19 @ 08:00) (10 - 28)  SpO2: 100% (19 @ 08:00) (99% - 100%)  Wt(kg): --  I&O's Summary    04 Dec 2019 07:  -  05 Dec 2019 07:00  --------------------------------------------------------  IN: 3213.8 mL / OUT: 3785 mL / NET: -571.2 mL    05 Dec 2019 07:  -  05 Dec 2019 08:25  --------------------------------------------------------  IN: 63 mL / OUT: 100 mL / NET: -37 mL        Appearance: No Acute Distress	  HEENT:  Normal oral mucosa, PERRL, EOMI	  Cardiovascular: Normal S1 S2, No JVD, No murmurs/rubs/gallops. sternal incision c/d/i. multiple chest tubes draining serosanguinous fluid  Respiratory: Lungs clear to auscultation bilaterally  Gastrointestinal:  Soft, Non-tender, + BS	  Skin: No rashes, No ecchymoses, No cyanosis	  Neurologic: Non-focal  Extremities: No clubbing, cyanosis or edema  Vascular: Peripheral pulses palpable 2+ bilaterally  Psychiatry: A & O x 3, Mood & affect appropriate    Laboratory Data:	 	    CBC Full  -  ( 05 Dec 2019 02:11 )  WBC Count : 10.66 K/uL  Hemoglobin : 9.6 g/dL  Hematocrit : 27.8 %  Platelet Count - Automated : 123 K/uL  Mean Cell Volume : 94.6 fl  Mean Cell Hemoglobin : 32.7 pg  Mean Cell Hemoglobin Concentration : 34.5 gm/dL  Auto Neutrophil # : x  Auto Lymphocyte # : x  Auto Monocyte # : x  Auto Eosinophil # : x  Auto Basophil # : x  Auto Neutrophil % : x  Auto Lymphocyte % : x  Auto Monocyte % : x  Auto Eosinophil % : x  Auto Basophil % : x        139  |  105  |  10  ----------------------------<  117<H>  4.5   |  23  |  0.66      146<H>  |  109<H>  |  9   ----------------------------<  127<H>  4.1   |  24  |  0.50    Ca    8.8      05 Dec 2019 02:11  Ca    8.8      04 Dec 2019 13:42  Phos  2.7     -  Phos  2.4       Mg     2.2       Mg     3.1         TPro  6.1  /  Alb  4.0  /  TBili  1.8<H>  /  DBili  x   /  AST  36  /  ALT  13  /  AlkPhos  33<L>    TPro  4.3<L>  /  Alb  2.5<L>  /  TBili  1.0  /  DBili  x   /  AST  23  /  ALT  10  /  AlkPhos  34<L>            Interpretation of Telemetry:  NSR	      	  PASQUALE inta-op:  Conclusions:  1. Normal mitral valve.  2. Normal trileaflet aortic valve. No aortic stenosis.  3. Severe aortic regurgitation, central jet. Vena contracta  measures 0.53 cm.  4. Normal left atrium.  5. Normal left ventricular internal dimensions and wall  thicknesses.  6. Normal left ventricular systolic function. No segmental  wall motion abnormalities. EF 55% by Dumont's method.  7. Normal diastolic function  8. Normal right atrium.  9. Normal right ventricular size and function.  10. Estimated PA pressure is 24 mmHg, consistent with  normal PA pressures  11. Normal tricuspid valve. Mild tricuspid regurgitation.  12. Normal pulmonic valve.  13. Normal pericardium with no pericardial effusion.    Assessment:  -hx of moderate lv dysfunction now with normalization of LVEF  -severe AI and dilated ascending aorta s/p porcine aortic valve replacement and ascending aortic aneurysm repair   -hx of chemotherapy and radiation for breast cancer, previously on herceptin  -postop cardiogenic shock    Recs:  s/p avr and aorta root repair under the excellent care of dr rice  -appreciate ct icu care  -wean  as tolerates. monitor map, lactate uop  -appears euvolemic  -beta blockers when tolerates  -management of chest tubes and external pacer wires per cts        Greater than 60 minutes spent on total encounter; more than 50% of the visit was spent counseling and/or coordinating care by the attending physician.   	  Shar Andrade MD   Cardiovascular Diseases  [phone]

## 2019-12-05 NOTE — PHYSICAL THERAPY INITIAL EVALUATION ADULT - ADDITIONAL COMMENTS
Pt lives in a private home with . Pt states there is a full flight of stairs to enter the house, no steps once inside the house. Pt did not require any assistive device prior to surgery.

## 2019-12-06 DIAGNOSIS — Z95.2 PRESENCE OF PROSTHETIC HEART VALVE: ICD-10-CM

## 2019-12-06 LAB
ALBUMIN SERPL ELPH-MCNC: 4.5 G/DL — SIGNIFICANT CHANGE UP (ref 3.3–5)
ALP SERPL-CCNC: 33 U/L — LOW (ref 40–120)
ALT FLD-CCNC: 13 U/L — SIGNIFICANT CHANGE UP (ref 10–45)
ANION GAP SERPL CALC-SCNC: 13 MMOL/L — SIGNIFICANT CHANGE UP (ref 5–17)
AST SERPL-CCNC: 33 U/L — SIGNIFICANT CHANGE UP (ref 10–40)
BILIRUB SERPL-MCNC: 1.4 MG/DL — HIGH (ref 0.2–1.2)
BUN SERPL-MCNC: 11 MG/DL — SIGNIFICANT CHANGE UP (ref 7–23)
CALCIUM SERPL-MCNC: 9.3 MG/DL — SIGNIFICANT CHANGE UP (ref 8.4–10.5)
CHLORIDE SERPL-SCNC: 100 MMOL/L — SIGNIFICANT CHANGE UP (ref 96–108)
CO2 SERPL-SCNC: 24 MMOL/L — SIGNIFICANT CHANGE UP (ref 22–31)
CREAT SERPL-MCNC: 0.64 MG/DL — SIGNIFICANT CHANGE UP (ref 0.5–1.3)
GAS PNL BLDA: SIGNIFICANT CHANGE UP
GAS PNL BLDA: SIGNIFICANT CHANGE UP
GLUCOSE BLDC GLUCOMTR-MCNC: 128 MG/DL — HIGH (ref 70–99)
GLUCOSE BLDC GLUCOMTR-MCNC: 153 MG/DL — HIGH (ref 70–99)
GLUCOSE SERPL-MCNC: 154 MG/DL — HIGH (ref 70–99)
HCT VFR BLD CALC: 26.9 % — LOW (ref 34.5–45)
HGB BLD-MCNC: 9.1 G/DL — LOW (ref 11.5–15.5)
MAGNESIUM SERPL-MCNC: 2.3 MG/DL — SIGNIFICANT CHANGE UP (ref 1.6–2.6)
MCHC RBC-ENTMCNC: 32.2 PG — SIGNIFICANT CHANGE UP (ref 27–34)
MCHC RBC-ENTMCNC: 33.8 GM/DL — SIGNIFICANT CHANGE UP (ref 32–36)
MCV RBC AUTO: 95.1 FL — SIGNIFICANT CHANGE UP (ref 80–100)
NRBC # BLD: 0 /100 WBCS — SIGNIFICANT CHANGE UP (ref 0–0)
PHOSPHATE SERPL-MCNC: 2.1 MG/DL — LOW (ref 2.5–4.5)
PLATELET # BLD AUTO: 130 K/UL — LOW (ref 150–400)
POTASSIUM SERPL-MCNC: 4.7 MMOL/L — SIGNIFICANT CHANGE UP (ref 3.5–5.3)
POTASSIUM SERPL-SCNC: 4.7 MMOL/L — SIGNIFICANT CHANGE UP (ref 3.5–5.3)
PROT SERPL-MCNC: 6.3 G/DL — SIGNIFICANT CHANGE UP (ref 6–8.3)
RBC # BLD: 2.83 M/UL — LOW (ref 3.8–5.2)
RBC # FLD: 14.6 % — HIGH (ref 10.3–14.5)
SODIUM SERPL-SCNC: 137 MMOL/L — SIGNIFICANT CHANGE UP (ref 135–145)
WBC # BLD: 16.91 K/UL — HIGH (ref 3.8–10.5)
WBC # FLD AUTO: 16.91 K/UL — HIGH (ref 3.8–10.5)

## 2019-12-06 PROCEDURE — 93010 ELECTROCARDIOGRAM REPORT: CPT

## 2019-12-06 PROCEDURE — 71045 X-RAY EXAM CHEST 1 VIEW: CPT | Mod: 26

## 2019-12-06 PROCEDURE — 99291 CRITICAL CARE FIRST HOUR: CPT

## 2019-12-06 RX ORDER — METOPROLOL TARTRATE 50 MG
12.5 TABLET ORAL ONCE
Refills: 0 | Status: COMPLETED | OUTPATIENT
Start: 2019-12-06 | End: 2019-12-06

## 2019-12-06 RX ORDER — INSULIN LISPRO 100/ML
VIAL (ML) SUBCUTANEOUS
Refills: 0 | Status: DISCONTINUED | OUTPATIENT
Start: 2019-12-06 | End: 2019-12-06

## 2019-12-06 RX ORDER — INSULIN HUMAN 100 [IU]/ML
INJECTION, SOLUTION SUBCUTANEOUS
Refills: 0 | Status: ACTIVE | OUTPATIENT
Start: 2019-12-06 | End: 2020-11-03

## 2019-12-06 RX ORDER — SODIUM CHLORIDE 9 MG/ML
3 INJECTION INTRAMUSCULAR; INTRAVENOUS; SUBCUTANEOUS EVERY 8 HOURS
Refills: 0 | Status: ACTIVE | OUTPATIENT
Start: 2019-12-06 | End: 2020-11-03

## 2019-12-06 RX ORDER — LETROZOLE 2.5 MG/1
2.5 TABLET, FILM COATED ORAL DAILY
Refills: 0 | Status: ACTIVE | OUTPATIENT
Start: 2019-12-06 | End: 2020-11-03

## 2019-12-06 RX ORDER — METOPROLOL TARTRATE 50 MG
25 TABLET ORAL EVERY 8 HOURS
Refills: 0 | Status: DISCONTINUED | OUTPATIENT
Start: 2019-12-06 | End: 2019-12-07

## 2019-12-06 RX ORDER — METOPROLOL TARTRATE 50 MG
25 TABLET ORAL
Refills: 0 | Status: DISCONTINUED | OUTPATIENT
Start: 2019-12-06 | End: 2019-12-06

## 2019-12-06 RX ADMIN — FAMOTIDINE 20 MILLIGRAM(S): 10 INJECTION INTRAVENOUS at 14:26

## 2019-12-06 RX ADMIN — LETROZOLE 2.5 MILLIGRAM(S): 2.5 TABLET, FILM COATED ORAL at 23:50

## 2019-12-06 RX ADMIN — Medication 12.5 MILLIGRAM(S): at 09:28

## 2019-12-06 RX ADMIN — SODIUM CHLORIDE 3 MILLILITER(S): 9 INJECTION INTRAMUSCULAR; INTRAVENOUS; SUBCUTANEOUS at 21:18

## 2019-12-06 RX ADMIN — Medication 62.5 MILLIMOLE(S): at 04:33

## 2019-12-06 RX ADMIN — Medication 100 MILLIGRAM(S): at 23:50

## 2019-12-06 RX ADMIN — Medication 100 MILLIGRAM(S): at 17:20

## 2019-12-06 RX ADMIN — Medication 100 MILLIGRAM(S): at 08:47

## 2019-12-06 RX ADMIN — HEPARIN SODIUM 5000 UNIT(S): 5000 INJECTION INTRAVENOUS; SUBCUTANEOUS at 14:28

## 2019-12-06 RX ADMIN — CHLORHEXIDINE GLUCONATE 1 APPLICATION(S): 213 SOLUTION TOPICAL at 05:26

## 2019-12-06 RX ADMIN — SENNA PLUS 2 TABLET(S): 8.6 TABLET ORAL at 21:18

## 2019-12-06 RX ADMIN — Medication 81 MILLIGRAM(S): at 14:26

## 2019-12-06 RX ADMIN — HEPARIN SODIUM 5000 UNIT(S): 5000 INJECTION INTRAVENOUS; SUBCUTANEOUS at 05:26

## 2019-12-06 RX ADMIN — POLYETHYLENE GLYCOL 3350 17 GRAM(S): 17 POWDER, FOR SOLUTION ORAL at 14:26

## 2019-12-06 RX ADMIN — Medication 100 MILLIGRAM(S): at 00:18

## 2019-12-06 RX ADMIN — Medication 12.5 MILLIGRAM(S): at 08:47

## 2019-12-06 RX ADMIN — Medication 25 MILLIGRAM(S): at 21:17

## 2019-12-06 RX ADMIN — HEPARIN SODIUM 5000 UNIT(S): 5000 INJECTION INTRAVENOUS; SUBCUTANEOUS at 21:18

## 2019-12-06 RX ADMIN — Medication 25 MILLIGRAM(S): at 17:20

## 2019-12-06 RX ADMIN — SODIUM CHLORIDE 3 MILLILITER(S): 9 INJECTION INTRAMUSCULAR; INTRAVENOUS; SUBCUTANEOUS at 13:52

## 2019-12-06 RX ADMIN — Medication 50 MICROGRAM(S): at 05:26

## 2019-12-06 NOTE — PROGRESS NOTE ADULT - SUBJECTIVE AND OBJECTIVE BOX
Cardiovascular Disease Progress Note    Overnight events: No acute events overnight.  no cp/sob/palps/dizziness. a line placed due to rising lactate and decreased uop  Otherwise review of systems negative    Objective Findings:  T(C): 37.5 (19 @ 04:00), Max: 37.9 (19 @ 00:00)  HR: 104 (19 @ 07:00) (75 - 120)  BP: 111/59 (19 @ 14:00) (98/52 - 161/73)  RR: 20 (19 @ 07:00) (14 - 34)  SpO2: 100% (19 @ 07:00) (98% - 100%)  Wt(kg): --  Daily     Daily Weight in k.3 (06 Dec 2019 00:00)      Physical Exam:  Gen: NAD  HEENT: EOMI  CV: RRR, normal S1 + S2, no m/r/g. sternal incision c/d/i  Lungs: CTAB  Abd: soft, non-tender  Ext: No edema    Telemetry: nsr st    Laboratory Data:                        9.1    16.91 )-----------( 130      ( 06 Dec 2019 00:37 )             26.9     12    137  |  100  |  11  ----------------------------<  154<H>  4.7   |  24  |  0.64    Ca    9.3      06 Dec 2019 00:37  Phos  2.1       Mg     2.3         TPro  6.3  /  Alb  4.5  /  TBili  1.4<H>  /  DBili  x   /  AST  33  /  ALT  13  /  AlkPhos  33<L>  12-    PT/INR - ( 05 Dec 2019 02:11 )   PT: 14.4 sec;   INR: 1.24 ratio         PTT - ( 05 Dec 2019 02:11 )  PTT:27.7 sec  CARDIAC MARKERS ( 04 Dec 2019 13:42 )  x     / x     / 218 U/L / x     / 20.1 ng/mL          Inpatient Medications:  MEDICATIONS  (STANDING):  aspirin enteric coated 81 milliGRAM(s) Oral daily  atorvastatin 40 milliGRAM(s) Oral at bedtime  cefuroxime  IVPB 1500 milliGRAM(s) IV Intermittent every 8 hours  chlorhexidine 2% Cloths 1 Application(s) Topical <User Schedule>  dextrose 5%. 1000 milliLiter(s) (50 mL/Hr) IV Continuous <Continuous>  dextrose 50% Injectable 50 milliLiter(s) IV Push every 15 minutes  dextrose 50% Injectable 25 milliLiter(s) IV Push every 15 minutes  famotidine    Tablet 20 milliGRAM(s) Oral daily  heparin  Injectable 5000 Unit(s) SubCutaneous every 8 hours  insulin lispro (HumaLOG) corrective regimen sliding scale   SubCutaneous three times a day before meals  insulin lispro (HumaLOG) corrective regimen sliding scale   SubCutaneous at bedtime  levothyroxine 50 MICROGram(s) Oral <User Schedule>  polyethylene glycol 3350 17 Gram(s) Oral daily  potassium chloride  10 mEq/50 mL IVPB 10 milliEquivalent(s) IV Intermittent every 1 hour  potassium chloride  10 mEq/50 mL IVPB 10 milliEquivalent(s) IV Intermittent every 1 hour  potassium chloride  10 mEq/50 mL IVPB 10 milliEquivalent(s) IV Intermittent every 1 hour  senna 2 Tablet(s) Oral at bedtime  sodium chloride 0.9%. 1000 milliLiter(s) (10 mL/Hr) IV Continuous <Continuous>      Assessment:  -hx of moderate lv dysfunction now with normalization of LVEF  -severe AI and dilated ascending aorta s/p porcine aortic valve replacement and ascending aortic aneurysm repair   -hx of chemotherapy and radiation for breast cancer, previously on herceptin  -postop cardiogenic shock    Recs:  s/p avr and aorta root repair under the excellent care of dr rice 19  -appreciate ct icu care  -weaned off . monitor map, lactate uop. a line in place  -appears euvolemic  -beta blockers for sinus tach, aortopathy when tolerates  -management of chest tubes and external pacer wires per cts      Over 30 minutes spent on total encounter; more than 50% of the visit was spent counseling and/or coordinating care by the attending physician.      Shar Andrade MD   Cardiovascular Disease  [phone]

## 2019-12-06 NOTE — PROGRESS NOTE ADULT - SUBJECTIVE AND OBJECTIVE BOX
VITAL SIGNS    Telemetry:   Vital Signs Last 24 Hrs  T(C): 36.7 (19 @ 14:03), Max: 37.9 (19 @ 00:00)  T(F): 98.1 (19 @ 14:03), Max: 100.2 (19 @ 00:00)  HR: 104 (19 @ 14:03) (95 - 121)  BP: 115/75 (19 @ 14:03) (115/75 - 121/80)  RR: 18 (19 @ 14:03) (14 - 34)  SpO2: 96% (19 @ 14:03) (96% - 100%)             @ 07:01  -   @ 07:00  --------------------------------------------------------  IN: 868.5 mL / OUT: 2180 mL / NET: -1311.5 mL     @ 07:01  -   @ 16:23  --------------------------------------------------------  IN: 132.5 mL / OUT: 920 mL / NET: -787.5 mL       Daily     Daily Weight in k.3 (06 Dec 2019 00:00)  Admit Wt: Drug Dosing Weight  Height (cm): 147.32 (04 Dec 2019 11:14)  Weight (kg): 50 (04 Dec 2019 13:15)  BMI (kg/m2): 23 (04 Dec 2019 13:15)  BSA (m2): 1.41 (04 Dec 2019 13:15)    Bilirubin Total, Serum: 1.4 mg/dL ( @ 00:37)    CAPILLARY BLOOD GLUCOSE  153 (06 Dec 2019 08:00)      POCT Blood Glucose.: 128 mg/dL (06 Dec 2019 14:20)  POCT Blood Glucose.: 153 mg/dL (06 Dec 2019 07:52)  POCT Blood Glucose.: 160 mg/dL (05 Dec 2019 22:07)          MEDICATIONS  acetaminophen   Tablet .. 650 milliGRAM(s) Oral every 6 hours PRN  aspirin enteric coated 81 milliGRAM(s) Oral daily  atorvastatin 40 milliGRAM(s) Oral at bedtime  cefuroxime  IVPB 1500 milliGRAM(s) IV Intermittent every 8 hours  chlorhexidine 2% Cloths 1 Application(s) Topical <User Schedule>  dextrose 40% Gel 15 Gram(s) Oral once PRN  dextrose 5%. 1000 milliLiter(s) IV Continuous <Continuous>  dextrose 50% Injectable 50 milliLiter(s) IV Push every 15 minutes  dextrose 50% Injectable 25 milliLiter(s) IV Push every 15 minutes  famotidine    Tablet 20 milliGRAM(s) Oral daily  glucagon  Injectable 1 milliGRAM(s) IntraMuscular once PRN  heparin  Injectable 5000 Unit(s) SubCutaneous every 8 hours  insulin lispro (HumaLOG) corrective regimen sliding scale   SubCutaneous three times a day before meals  insulin regular  human corrective regimen sliding scale   SubCutaneous three times a day before meals  levothyroxine 50 MICROGram(s) Oral <User Schedule>  metoprolol tartrate 25 milliGRAM(s) Oral two times a day  oxyCODONE    IR 5 milliGRAM(s) Oral every 4 hours PRN  oxyCODONE    IR 10 milliGRAM(s) Oral every 4 hours PRN  polyethylene glycol 3350 17 Gram(s) Oral daily  senna 2 Tablet(s) Oral at bedtime  sodium chloride 0.9% lock flush 3 milliLiter(s) IV Push every 8 hours  sodium chloride 0.9%. 1000 milliLiter(s) IV Continuous <Continuous>      Interval History: Pt denies any chest pain, sob, palpitations, N//V/D. No acute events overnight    PHYSICAL EXAM  General: WN, WD, NAD  Neurology: alert and oriented x 3, nonfocal, no gross deficits  CV : s1, s2  Sternal Wound :  CDI , Stable + AV PW VVI 40/12/.8  Lungs: CTA B/L   Abdomen: soft, NT,ND, (+ )Bowel sounds  :  voiding  Extremities: Trace edema of LE B/L. PP 2+ B/L. Neg calve tenderness b/l      LABS      137  |  100  |  11  ----------------------------<  154<H>  4.7   |  24  |  0.64    Ca    9.3      06 Dec 2019 00:37  Phos  2.1       Mg     2.3         TPro  6.3  /  Alb  4.5  /  TBili  1.4<H>  /  DBili  x   /  AST  33  /  ALT  13  /  AlkPhos  33<L>                                   9.1    16.91 )-----------( 130      ( 06 Dec 2019 00:37 )             26.9          PT/INR - ( 05 Dec 2019 02:11 )   PT: 14.4 sec;   INR: 1.24 ratio         PTT - ( 05 Dec 2019 02:11 )  PTT:27.7 sec       PAST MEDICAL & SURGICAL HISTORY:  Former smoker: &gt;30 yr smoker; quit ~1 yr ago 2018  Thoracic aortic aneurysm without rupture  Nonrheumatic aortic valve stenosis  Hyperlipidemia  Hypertension  Hypothyroidism  Breast cancer: left side s/p lumpectomy, chemotherapy and radiation  History of vascular access device: s/p mediport placement  and removal ~10/2019  H/O bilateral cataract extraction: 2018  S/P breast lumpectomy: left sided 2019

## 2019-12-06 NOTE — PROGRESS NOTE ADULT - SUBJECTIVE AND OBJECTIVE BOX
CRITICAL CARE ATTENDING - CTICU    MEDICATIONS  (STANDING):  aspirin enteric coated 81 milliGRAM(s) Oral daily  atorvastatin 40 milliGRAM(s) Oral at bedtime  cefuroxime  IVPB 1500 milliGRAM(s) IV Intermittent every 8 hours  chlorhexidine 2% Cloths 1 Application(s) Topical <User Schedule>  famotidine    Tablet 20 milliGRAM(s) Oral daily  heparin  Injectable 5000 Unit(s) SubCutaneous every 8 hours  insulin lispro (HumaLOG) corrective regimen sliding scale   SubCutaneous three times a day before meals  insulin regular  human corrective regimen sliding scale   SubCutaneous three times a day before meals  levothyroxine 50 MICROGram(s) Oral <User Schedule>  polyethylene glycol 3350 17 Gram(s) Oral daily  senna 2 Tablet(s) Oral at bedtime      Critically Ill patient  : [ ] preoperative ,   [ x] post operative    Requires :  [ x] Arterial Line   [x ] Central Line  [ ] PA catheter  [ ] IABP  [ ] ECMO  [ ] LVAD  [ x] Ventilator  [x ] pacemaker [ ] Impella.                      [x ] ABG's     [x ] Pulse Oxymetry Monitoring  Bedside evaluation , monitoring , treatment of hemodynamics and fluids    Diagnosis:     Op day - AVR / Ascending Aortic Repair     Hemodynamic lability,  instability. Requires IVCD [x ] vasopressors [x ] inotropes  [x ] vasodilator  [ x]IVSS fluid  to maintain MAP, perfusion, C.I.     Discussed with CT surgeon, Physician's Assistant - Nurse Practitioner- Critical care medicine team.   Discussed at  AM / PM rounds.   Chart, labs , films reviewed.    Total Time: 30 min

## 2019-12-06 NOTE — PROGRESS NOTE ADULT - ASSESSMENT
69yr old female admitted for an Aortic Valve Replacement and Ascending Aneurysm Repair on 12/4/2019. Pt w/ PMH of HTN, HLD, Hypothyroidism and Breast CA (left breast- s/p resection 4/19 w/ chemo/RT and currently on Letrozole).    12/4 S/P Aortic Valve Replacement (porcine valve)  and Ascending Aneurysm Repair  12/6 Transferred to floor. Lt chest tube in place. Monitor drainage. BB up titrated to lopressor 50 TID  D/C planning to home when medically cleared

## 2019-12-07 DIAGNOSIS — Z96.89 PRESENCE OF OTHER SPECIFIED FUNCTIONAL IMPLANTS: ICD-10-CM

## 2019-12-07 DIAGNOSIS — Z29.9 ENCOUNTER FOR PROPHYLACTIC MEASURES, UNSPECIFIED: ICD-10-CM

## 2019-12-07 LAB
ANION GAP SERPL CALC-SCNC: 12 MMOL/L — SIGNIFICANT CHANGE UP (ref 5–17)
BASOPHILS # BLD AUTO: 0.02 K/UL — SIGNIFICANT CHANGE UP (ref 0–0.2)
BASOPHILS NFR BLD AUTO: 0.1 % — SIGNIFICANT CHANGE UP (ref 0–2)
BUN SERPL-MCNC: 14 MG/DL — SIGNIFICANT CHANGE UP (ref 7–23)
CALCIUM SERPL-MCNC: 9.6 MG/DL — SIGNIFICANT CHANGE UP (ref 8.4–10.5)
CHLORIDE SERPL-SCNC: 99 MMOL/L — SIGNIFICANT CHANGE UP (ref 96–108)
CO2 SERPL-SCNC: 26 MMOL/L — SIGNIFICANT CHANGE UP (ref 22–31)
CREAT SERPL-MCNC: 0.55 MG/DL — SIGNIFICANT CHANGE UP (ref 0.5–1.3)
EOSINOPHIL # BLD AUTO: 0 K/UL — SIGNIFICANT CHANGE UP (ref 0–0.5)
EOSINOPHIL NFR BLD AUTO: 0 % — SIGNIFICANT CHANGE UP (ref 0–6)
GLUCOSE BLDC GLUCOMTR-MCNC: 118 MG/DL — HIGH (ref 70–99)
GLUCOSE BLDC GLUCOMTR-MCNC: 128 MG/DL — HIGH (ref 70–99)
GLUCOSE BLDC GLUCOMTR-MCNC: 132 MG/DL — HIGH (ref 70–99)
GLUCOSE BLDC GLUCOMTR-MCNC: 97 MG/DL — SIGNIFICANT CHANGE UP (ref 70–99)
GLUCOSE SERPL-MCNC: 106 MG/DL — HIGH (ref 70–99)
HCT VFR BLD CALC: 29.6 % — LOW (ref 34.5–45)
HGB BLD-MCNC: 9.8 G/DL — LOW (ref 11.5–15.5)
IMM GRANULOCYTES NFR BLD AUTO: 1.3 % — SIGNIFICANT CHANGE UP (ref 0–1.5)
LYMPHOCYTES # BLD AUTO: 0.89 K/UL — LOW (ref 1–3.3)
LYMPHOCYTES # BLD AUTO: 5.6 % — LOW (ref 13–44)
MCHC RBC-ENTMCNC: 32.5 PG — SIGNIFICANT CHANGE UP (ref 27–34)
MCHC RBC-ENTMCNC: 33.1 GM/DL — SIGNIFICANT CHANGE UP (ref 32–36)
MCV RBC AUTO: 98 FL — SIGNIFICANT CHANGE UP (ref 80–100)
MONOCYTES # BLD AUTO: 1.13 K/UL — HIGH (ref 0–0.9)
MONOCYTES NFR BLD AUTO: 7.1 % — SIGNIFICANT CHANGE UP (ref 2–14)
NEUTROPHILS # BLD AUTO: 13.65 K/UL — HIGH (ref 1.8–7.4)
NEUTROPHILS NFR BLD AUTO: 85.9 % — HIGH (ref 43–77)
PLATELET # BLD AUTO: 141 K/UL — LOW (ref 150–400)
POTASSIUM SERPL-MCNC: 4.5 MMOL/L — SIGNIFICANT CHANGE UP (ref 3.5–5.3)
POTASSIUM SERPL-SCNC: 4.5 MMOL/L — SIGNIFICANT CHANGE UP (ref 3.5–5.3)
RBC # BLD: 3.02 M/UL — LOW (ref 3.8–5.2)
RBC # FLD: 14.6 % — HIGH (ref 10.3–14.5)
SODIUM SERPL-SCNC: 137 MMOL/L — SIGNIFICANT CHANGE UP (ref 135–145)
WBC # BLD: 15.89 K/UL — HIGH (ref 3.8–10.5)
WBC # FLD AUTO: 15.89 K/UL — HIGH (ref 3.8–10.5)

## 2019-12-07 PROCEDURE — 71045 X-RAY EXAM CHEST 1 VIEW: CPT | Mod: 26,77,76

## 2019-12-07 PROCEDURE — 71045 X-RAY EXAM CHEST 1 VIEW: CPT | Mod: 26

## 2019-12-07 RX ORDER — COLCHICINE 0.6 MG
0.6 TABLET ORAL
Refills: 0 | Status: ACTIVE | OUTPATIENT
Start: 2019-12-07 | End: 2020-11-04

## 2019-12-07 RX ORDER — SPIRONOLACTONE 25 MG/1
25 TABLET, FILM COATED ORAL DAILY
Refills: 0 | Status: ACTIVE | OUTPATIENT
Start: 2019-12-08 | End: 2020-11-05

## 2019-12-07 RX ORDER — METOPROLOL TARTRATE 50 MG
25 TABLET ORAL ONCE
Refills: 0 | Status: COMPLETED | OUTPATIENT
Start: 2019-12-07 | End: 2019-12-07

## 2019-12-07 RX ORDER — HYDROMORPHONE HYDROCHLORIDE 2 MG/ML
0.25 INJECTION INTRAMUSCULAR; INTRAVENOUS; SUBCUTANEOUS ONCE
Refills: 0 | Status: DISCONTINUED | OUTPATIENT
Start: 2019-12-07 | End: 2019-12-07

## 2019-12-07 RX ORDER — FUROSEMIDE 40 MG
20 TABLET ORAL DAILY
Refills: 0 | Status: ACTIVE | OUTPATIENT
Start: 2019-12-08 | End: 2020-11-05

## 2019-12-07 RX ORDER — METOPROLOL TARTRATE 50 MG
50 TABLET ORAL
Refills: 0 | Status: ACTIVE | OUTPATIENT
Start: 2019-12-07 | End: 2020-11-04

## 2019-12-07 RX ORDER — FUROSEMIDE 40 MG
40 TABLET ORAL DAILY
Refills: 0 | Status: DISCONTINUED | OUTPATIENT
Start: 2019-12-07 | End: 2019-12-07

## 2019-12-07 RX ORDER — HYDROMORPHONE HYDROCHLORIDE 2 MG/ML
0.5 INJECTION INTRAMUSCULAR; INTRAVENOUS; SUBCUTANEOUS ONCE
Refills: 0 | Status: DISCONTINUED | OUTPATIENT
Start: 2019-12-07 | End: 2019-12-07

## 2019-12-07 RX ORDER — SPIRONOLACTONE 25 MG/1
25 TABLET, FILM COATED ORAL
Refills: 0 | Status: DISCONTINUED | OUTPATIENT
Start: 2019-12-07 | End: 2019-12-07

## 2019-12-07 RX ADMIN — Medication 25 MILLIGRAM(S): at 09:49

## 2019-12-07 RX ADMIN — HEPARIN SODIUM 5000 UNIT(S): 5000 INJECTION INTRAVENOUS; SUBCUTANEOUS at 21:39

## 2019-12-07 RX ADMIN — Medication 0.6 MILLIGRAM(S): at 11:37

## 2019-12-07 RX ADMIN — Medication 40 MILLIGRAM(S): at 09:49

## 2019-12-07 RX ADMIN — Medication 25 MILLIGRAM(S): at 05:42

## 2019-12-07 RX ADMIN — SODIUM CHLORIDE 3 MILLILITER(S): 9 INJECTION INTRAMUSCULAR; INTRAVENOUS; SUBCUTANEOUS at 21:43

## 2019-12-07 RX ADMIN — SODIUM CHLORIDE 3 MILLILITER(S): 9 INJECTION INTRAMUSCULAR; INTRAVENOUS; SUBCUTANEOUS at 06:27

## 2019-12-07 RX ADMIN — CHLORHEXIDINE GLUCONATE 1 APPLICATION(S): 213 SOLUTION TOPICAL at 09:52

## 2019-12-07 RX ADMIN — HEPARIN SODIUM 5000 UNIT(S): 5000 INJECTION INTRAVENOUS; SUBCUTANEOUS at 14:00

## 2019-12-07 RX ADMIN — HYDROMORPHONE HYDROCHLORIDE 0.5 MILLIGRAM(S): 2 INJECTION INTRAMUSCULAR; INTRAVENOUS; SUBCUTANEOUS at 13:33

## 2019-12-07 RX ADMIN — Medication 81 MILLIGRAM(S): at 11:38

## 2019-12-07 RX ADMIN — Medication 50 MICROGRAM(S): at 05:42

## 2019-12-07 RX ADMIN — HEPARIN SODIUM 5000 UNIT(S): 5000 INJECTION INTRAVENOUS; SUBCUTANEOUS at 05:42

## 2019-12-07 RX ADMIN — FAMOTIDINE 20 MILLIGRAM(S): 10 INJECTION INTRAVENOUS at 11:38

## 2019-12-07 RX ADMIN — LETROZOLE 2.5 MILLIGRAM(S): 2.5 TABLET, FILM COATED ORAL at 11:38

## 2019-12-07 RX ADMIN — HYDROMORPHONE HYDROCHLORIDE 0.25 MILLIGRAM(S): 2 INJECTION INTRAMUSCULAR; INTRAVENOUS; SUBCUTANEOUS at 13:55

## 2019-12-07 RX ADMIN — Medication 0.6 MILLIGRAM(S): at 18:41

## 2019-12-07 RX ADMIN — SODIUM CHLORIDE 3 MILLILITER(S): 9 INJECTION INTRAMUSCULAR; INTRAVENOUS; SUBCUTANEOUS at 14:16

## 2019-12-07 RX ADMIN — SENNA PLUS 2 TABLET(S): 8.6 TABLET ORAL at 21:39

## 2019-12-07 RX ADMIN — Medication 50 MILLIGRAM(S): at 18:42

## 2019-12-07 RX ADMIN — HYDROMORPHONE HYDROCHLORIDE 0.25 MILLIGRAM(S): 2 INJECTION INTRAMUSCULAR; INTRAVENOUS; SUBCUTANEOUS at 12:00

## 2019-12-07 NOTE — PROGRESS NOTE ADULT - SUBJECTIVE AND OBJECTIVE BOX
VITAL SIGNS    Telemetry:    Vital Signs Last 24 Hrs  T(C): 36.7 (19 @ 05:14), Max: 36.8 (19 @ 11:00)  T(F): 98.1 (19 @ 05:14), Max: 98.2 (19 @ 11:00)  HR: 96 (19 @ 09:48) (90 - 105)  BP: 113/76 (19 @ 09:48) (113/76 - 133/84)  RR: 18 (19 @ 05:14) (18 - 18)  SpO2: 91% (19 @ 05:14) (91% - 98%)             @ 07:01  -   @ 07:00  --------------------------------------------------------  IN: 382.5 mL / OUT: 2600 mL / NET: -2217.5 mL     @ 07:01  -   @ 10:24  --------------------------------------------------------  IN: 360 mL / OUT: 500 mL / NET: -140 mL       Daily     Daily Weight in k.3 (07 Dec 2019 10:07)  Admit Wt: Drug Dosing Weight  Height (cm): 147.32 (04 Dec 2019 11:14)  Weight (kg): 50 (04 Dec 2019 13:15)  BMI (kg/m2): 23 (04 Dec 2019 13:15)  BSA (m2): 1.41 (04 Dec 2019 13:15)      CAPILLARY BLOOD GLUCOSE      POCT Blood Glucose.: 132 mg/dL (07 Dec 2019 07:52)  POCT Blood Glucose.: 138 mg/dL (06 Dec 2019 17:14)  POCT Blood Glucose.: 128 mg/dL (06 Dec 2019 14:20)          acetaminophen   Tablet .. 650 milliGRAM(s) Oral every 6 hours PRN  aspirin enteric coated 81 milliGRAM(s) Oral daily  atorvastatin 40 milliGRAM(s) Oral at bedtime  chlorhexidine 2% Cloths 1 Application(s) Topical <User Schedule>  dextrose 40% Gel 15 Gram(s) Oral once PRN  dextrose 5%. 1000 milliLiter(s) IV Continuous <Continuous>  dextrose 50% Injectable 50 milliLiter(s) IV Push every 15 minutes  dextrose 50% Injectable 25 milliLiter(s) IV Push every 15 minutes  famotidine    Tablet 20 milliGRAM(s) Oral daily  glucagon  Injectable 1 milliGRAM(s) IntraMuscular once PRN  heparin  Injectable 5000 Unit(s) SubCutaneous every 8 hours  insulin regular  human corrective regimen sliding scale   SubCutaneous three times a day before meals  letrozole 2.5 milliGRAM(s) Oral daily  levothyroxine 50 MICROGram(s) Oral <User Schedule>  metoprolol tartrate 50 milliGRAM(s) Oral two times a day  oxyCODONE    IR 5 milliGRAM(s) Oral every 4 hours PRN  oxyCODONE    IR 10 milliGRAM(s) Oral every 4 hours PRN  polyethylene glycol 3350 17 Gram(s) Oral daily  senna 2 Tablet(s) Oral at bedtime  sodium chloride 0.9% lock flush 3 milliLiter(s) IV Push every 8 hours  sodium chloride 0.9%. 1000 milliLiter(s) IV Continuous <Continuous>      PHYSICAL EXAM    Subjective: "Hi.   Neurology: alert and oriented x 3, nonfocal, no gross deficits  CV : tele:  RSR  Sternal Wound :  CDI with dressing , Stable  Lungs: clear. RR easy, unlabored   Abdomen: soft, nontender, nondistended, positive bowel sounds, bowel movement   Neg N/V/D   :  pt voiding without difficulty   Extremities:   FLORES; edema, neg calf tenderness.   PPP bilaterally      PW:  Chest tubes: VITAL SIGNS    Telemetry:  RSr   Vital Signs Last 24 Hrs  T(C): 36.7 (19 @ 05:14), Max: 36.8 (19 @ 11:00)  T(F): 98.1 (19 @ 05:14), Max: 98.2 (19 @ 11:00)  HR: 96 (19 @ 09:48) (90 - 105)  BP: 113/76 (19 @ 09:48) (113/76 - 133/84)  RR: 18 (19 @ 05:14) (18 - 18)  SpO2: 91% (19 @ 05:14) (91% - 98%)             @ 07:01  -   @ 07:00  --------------------------------------------------------  IN: 382.5 mL / OUT: 2600 mL / NET: -2217.5 mL     07:01  -   @ 10:24  --------------------------------------------------------  IN: 360 mL / OUT: 500 mL / NET: -140 mL       Daily     Daily Weight in k.3 (07 Dec 2019 10:07)  Admit Wt: Drug Dosing Weight  Height (cm): 147.32 (04 Dec 2019 11:14)  Weight (kg): 50 (04 Dec 2019 13:15)  BMI (kg/m2): 23 (04 Dec 2019 13:15)  BSA (m2): 1.41 (04 Dec 2019 13:15)      CAPILLARY BLOOD GLUCOSE      POCT Blood Glucose.: 132 mg/dL (07 Dec 2019 07:52)  POCT Blood Glucose.: 138 mg/dL (06 Dec 2019 17:14)  POCT Blood Glucose.: 128 mg/dL (06 Dec 2019 14:20)          acetaminophen   Tablet .. 650 milliGRAM(s) Oral every 6 hours PRN  aspirin enteric coated 81 milliGRAM(s) Oral daily  atorvastatin 40 milliGRAM(s) Oral at bedtime  chlorhexidine 2% Cloths 1 Application(s) Topical <User Schedule>  dextrose 40% Gel 15 Gram(s) Oral once PRN  dextrose 5%. 1000 milliLiter(s) IV Continuous <Continuous>  dextrose 50% Injectable 50 milliLiter(s) IV Push every 15 minutes  dextrose 50% Injectable 25 milliLiter(s) IV Push every 15 minutes  famotidine    Tablet 20 milliGRAM(s) Oral daily  glucagon  Injectable 1 milliGRAM(s) IntraMuscular once PRN  heparin  Injectable 5000 Unit(s) SubCutaneous every 8 hours  insulin regular  human corrective regimen sliding scale   SubCutaneous three times a day before meals  letrozole 2.5 milliGRAM(s) Oral daily  levothyroxine 50 MICROGram(s) Oral <User Schedule>  metoprolol tartrate 50 milliGRAM(s) Oral two times a day  oxyCODONE    IR 5 milliGRAM(s) Oral every 4 hours PRN  oxyCODONE    IR 10 milliGRAM(s) Oral every 4 hours PRN  polyethylene glycol 3350 17 Gram(s) Oral daily  senna 2 Tablet(s) Oral at bedtime  sodium chloride 0.9% lock flush 3 milliLiter(s) IV Push every 8 hours  sodium chloride 0.9%. 1000 milliLiter(s) IV Continuous <Continuous>      PHYSICAL EXAM    Subjective: "I just want to go home."   Neurology: alert and oriented x 3, nonfocal, no gross deficits  CV : tele:  RSR    Sternal Wound :  CDI with dressing , Stable; +pw -vvi 40 ma 10   Lungs: clear. RR easy, unlabored; + Left pleural ct draining moderate amount of serous- sanguinous drainage   Abdomen: soft, nontender, nondistended, positive bowel sounds, + bowel movement   Neg N/V/D   :  pt voiding without difficulty   Extremities:   FLORES; trace LE edema, neg calf tenderness.   PPP bilaterally      PW: + vvi 40 ma 10   Chest tubes: + Left pleural ct draining moderate amount of serous- sanguinous drainage

## 2019-12-07 NOTE — PROGRESS NOTE ADULT - ASSESSMENT
69yr old female admitted for an Aortic Valve Replacement and Ascending Aneurysm Repair on 12/4/2019. Pt w/ PMH of HTN, HLD, Hypothyroidism and Breast CA (left breast- s/p resection 4/19 w/ chemo/RT and currently on Letrozole).    12/4 S/P Aortic Valve Replacement (porcine valve)  and Ascending Aneurysm Repair  12/6 Transferred to floor. Lt chest tube in place. Monitor drainage. BB up titrated to lopressor 50 TID  D/C planning to home when medically cleared 69yr old female admitted for an Aortic Valve Replacement and Ascending Aneurysm Repair on 12/4/2019. Pt w/ PMH of HTN, HLD, Hypothyroidism and Breast CA (left breast- s/p resection 4/19 w/ chemo/RT and currently on Letrozole).    12/4 S/P Aortic Valve Replacement (porcine valve)  and Ascending Aneurysm Repair  12/6 Transferred to floor. Lt chest tube in place. Monitor drainage. BB up titrated to lopressor 50 TID  12/7 VSS; + moderate amount of left CT drainage- diuresis and initiate colchicine .6 bid as per Dr. Fernandez; lopressor increased to 50 bid; right sono site by ctu; not able to drain at this time; pt asymptomatic  D/C planning to home mon/tue

## 2019-12-07 NOTE — PROGRESS NOTE ADULT - PROBLEM SELECTOR PLAN 2
+ moderate amount of left CT drainage- diuresis and initiate colchicine .6 bid  maintain left ct today  ck xray in am 12/8

## 2019-12-07 NOTE — PROGRESS NOTE ADULT - SUBJECTIVE AND OBJECTIVE BOX
Cardiovascular Disease Progress Note    Overnight events: No acute events overnight.  feels weak this morning. denies any cp/sob/palps/dizziness/edema  Otherwise review of systems negative    Objective Findings:  T(C): 36.7 (19 @ 05:14), Max: 36.8 (19 @ 11:00)  HR: 96 (19 @ 09:48) (90 - 105)  BP: 113/76 (19 @ 09:48) (113/76 - 133/84)  RR: 18 (19 @ 05:14) (18 - 18)  SpO2: 91% (19 @ 05:14) (91% - 98%)  Wt(kg): --  Daily     Daily Weight in k.3 (07 Dec 2019 10:07)      Physical Exam:  Gen: NAD  HEENT: EOMI  CV: RRR, normal S1 + S2, no m/r/g. sternal incision c/d/i  Lungs: CTAB  Abd: soft, non-tender  Ext: No edema    Telemetry: nsr    Laboratory Data:                        9.8    15.89 )-----------( 141      ( 07 Dec 2019 09:59 )             29.6         137  |  99  |  14  ----------------------------<  106<H>  4.5   |  26  |  0.55    Ca    9.6      07 Dec 2019 06:55  Phos  2.1       Mg     2.3         TPro  6.3  /  Alb  4.5  /  TBili  1.4<H>  /  DBili  x   /  AST  33  /  ALT  13  /  AlkPhos  33<L>                Inpatient Medications:  MEDICATIONS  (STANDING):  aspirin enteric coated 81 milliGRAM(s) Oral daily  atorvastatin 40 milliGRAM(s) Oral at bedtime  chlorhexidine 2% Cloths 1 Application(s) Topical <User Schedule>  dextrose 5%. 1000 milliLiter(s) (50 mL/Hr) IV Continuous <Continuous>  dextrose 50% Injectable 50 milliLiter(s) IV Push every 15 minutes  dextrose 50% Injectable 25 milliLiter(s) IV Push every 15 minutes  famotidine    Tablet 20 milliGRAM(s) Oral daily  furosemide    Tablet 40 milliGRAM(s) Oral daily  heparin  Injectable 5000 Unit(s) SubCutaneous every 8 hours  insulin regular  human corrective regimen sliding scale   SubCutaneous three times a day before meals  letrozole 2.5 milliGRAM(s) Oral daily  levothyroxine 50 MICROGram(s) Oral <User Schedule>  metoprolol tartrate 50 milliGRAM(s) Oral two times a day  polyethylene glycol 3350 17 Gram(s) Oral daily  senna 2 Tablet(s) Oral at bedtime  sodium chloride 0.9% lock flush 3 milliLiter(s) IV Push every 8 hours  sodium chloride 0.9%. 1000 milliLiter(s) (10 mL/Hr) IV Continuous <Continuous>  spironolactone 25 milliGRAM(s) Oral two times a day      Assessment:  -hx of moderate lv dysfunction now with normalization of LVEF  -severe AI and dilated ascending aorta s/p porcine aortic valve replacement and ascending aortic aneurysm repair   -hx of chemotherapy and radiation for breast cancer, previously on herceptin  -postop cardiogenic shock    Recs:  s/p avr and aorta root repair under the excellent care of dr rice 19  -appreciate ctu care  -appears euvolemic. would consider holding diuretics at this time  -beta blockers for sinus tach, aortopathy as tolerates  -management of chest tubes and external pacer wires per cts  -dvt ppx      Over 25 minutes spent on total encounter; more than 50% of the visit was spent counseling and/or coordinating care by the attending physician.      Shar Andrade MD   Cardiovascular Disease  [phone]

## 2019-12-07 NOTE — PROGRESS NOTE ADULT - PROBLEM SELECTOR PLAN 1
asa, statin, BB  analgesia  PT  Incentive spirometer continue postop care  continue asa/ statin/ increase lopressor 50 mg po bid  initiate diuresis and colchicine .6 bid  maintain left ct today  +pw- vvi 40 ma 10  pulm toilet  pain management  increase activity as tolerated  Discharge planning- home mon/tue if stable

## 2019-12-07 NOTE — PROVIDER CONTACT NOTE (OTHER) - ASSESSMENT
Breath sounds CTA in all fields. No shortness of breath at rest or exertion.  RA pulse oxyimetry 93%

## 2019-12-08 ENCOUNTER — TRANSCRIPTION ENCOUNTER (OUTPATIENT)
Age: 69
End: 2019-12-08

## 2019-12-08 VITALS
DIASTOLIC BLOOD PRESSURE: 77 MMHG | TEMPERATURE: 98 F | HEART RATE: 107 BPM | RESPIRATION RATE: 18 BRPM | SYSTOLIC BLOOD PRESSURE: 115 MMHG | OXYGEN SATURATION: 95 %

## 2019-12-08 LAB
ANION GAP SERPL CALC-SCNC: 12 MMOL/L — SIGNIFICANT CHANGE UP (ref 5–17)
BUN SERPL-MCNC: 22 MG/DL — SIGNIFICANT CHANGE UP (ref 7–23)
CALCIUM SERPL-MCNC: 8.9 MG/DL — SIGNIFICANT CHANGE UP (ref 8.4–10.5)
CHLORIDE SERPL-SCNC: 99 MMOL/L — SIGNIFICANT CHANGE UP (ref 96–108)
CO2 SERPL-SCNC: 25 MMOL/L — SIGNIFICANT CHANGE UP (ref 22–31)
CREAT SERPL-MCNC: 0.69 MG/DL — SIGNIFICANT CHANGE UP (ref 0.5–1.3)
GLUCOSE BLDC GLUCOMTR-MCNC: 108 MG/DL — HIGH (ref 70–99)
GLUCOSE BLDC GLUCOMTR-MCNC: 91 MG/DL — SIGNIFICANT CHANGE UP (ref 70–99)
GLUCOSE SERPL-MCNC: 136 MG/DL — HIGH (ref 70–99)
HCT VFR BLD CALC: 29.1 % — LOW (ref 34.5–45)
HGB BLD-MCNC: 9.6 G/DL — LOW (ref 11.5–15.5)
MCHC RBC-ENTMCNC: 32.4 PG — SIGNIFICANT CHANGE UP (ref 27–34)
MCHC RBC-ENTMCNC: 33 GM/DL — SIGNIFICANT CHANGE UP (ref 32–36)
MCV RBC AUTO: 98.3 FL — SIGNIFICANT CHANGE UP (ref 80–100)
NRBC # BLD: 0 /100 WBCS — SIGNIFICANT CHANGE UP (ref 0–0)
PLATELET # BLD AUTO: 168 K/UL — SIGNIFICANT CHANGE UP (ref 150–400)
POTASSIUM SERPL-MCNC: 3.5 MMOL/L — SIGNIFICANT CHANGE UP (ref 3.5–5.3)
POTASSIUM SERPL-SCNC: 3.5 MMOL/L — SIGNIFICANT CHANGE UP (ref 3.5–5.3)
RBC # BLD: 2.96 M/UL — LOW (ref 3.8–5.2)
RBC # FLD: 14.4 % — SIGNIFICANT CHANGE UP (ref 10.3–14.5)
SODIUM SERPL-SCNC: 136 MMOL/L — SIGNIFICANT CHANGE UP (ref 135–145)
WBC # BLD: 9.63 K/UL — SIGNIFICANT CHANGE UP (ref 3.8–10.5)
WBC # FLD AUTO: 9.63 K/UL — SIGNIFICANT CHANGE UP (ref 3.8–10.5)

## 2019-12-08 PROCEDURE — 84100 ASSAY OF PHOSPHORUS: CPT

## 2019-12-08 PROCEDURE — 71045 X-RAY EXAM CHEST 1 VIEW: CPT

## 2019-12-08 PROCEDURE — 88305 TISSUE EXAM BY PATHOLOGIST: CPT

## 2019-12-08 PROCEDURE — 85384 FIBRINOGEN ACTIVITY: CPT

## 2019-12-08 PROCEDURE — 82435 ASSAY OF BLOOD CHLORIDE: CPT

## 2019-12-08 PROCEDURE — 94002 VENT MGMT INPAT INIT DAY: CPT

## 2019-12-08 PROCEDURE — P9045: CPT

## 2019-12-08 PROCEDURE — 86901 BLOOD TYPING SEROLOGIC RH(D): CPT

## 2019-12-08 PROCEDURE — C1889: CPT

## 2019-12-08 PROCEDURE — P9059: CPT

## 2019-12-08 PROCEDURE — 80048 BASIC METABOLIC PNL TOTAL CA: CPT

## 2019-12-08 PROCEDURE — C1768: CPT

## 2019-12-08 PROCEDURE — C1751: CPT

## 2019-12-08 PROCEDURE — 82565 ASSAY OF CREATININE: CPT

## 2019-12-08 PROCEDURE — 36430 TRANSFUSION BLD/BLD COMPNT: CPT

## 2019-12-08 PROCEDURE — 71045 X-RAY EXAM CHEST 1 VIEW: CPT | Mod: 26,76

## 2019-12-08 PROCEDURE — C1769: CPT

## 2019-12-08 PROCEDURE — 85730 THROMBOPLASTIN TIME PARTIAL: CPT

## 2019-12-08 PROCEDURE — 85610 PROTHROMBIN TIME: CPT

## 2019-12-08 PROCEDURE — P9016: CPT

## 2019-12-08 PROCEDURE — 82962 GLUCOSE BLOOD TEST: CPT

## 2019-12-08 PROCEDURE — 82947 ASSAY GLUCOSE BLOOD QUANT: CPT

## 2019-12-08 PROCEDURE — 82550 ASSAY OF CK (CPK): CPT

## 2019-12-08 PROCEDURE — P9047: CPT

## 2019-12-08 PROCEDURE — 86900 BLOOD TYPING SEROLOGIC ABO: CPT

## 2019-12-08 PROCEDURE — 82330 ASSAY OF CALCIUM: CPT

## 2019-12-08 PROCEDURE — 82553 CREATINE MB FRACTION: CPT

## 2019-12-08 PROCEDURE — 93005 ELECTROCARDIOGRAM TRACING: CPT

## 2019-12-08 PROCEDURE — 80053 COMPREHEN METABOLIC PANEL: CPT

## 2019-12-08 PROCEDURE — 85027 COMPLETE CBC AUTOMATED: CPT

## 2019-12-08 PROCEDURE — 84132 ASSAY OF SERUM POTASSIUM: CPT

## 2019-12-08 PROCEDURE — 83605 ASSAY OF LACTIC ACID: CPT

## 2019-12-08 PROCEDURE — 86923 COMPATIBILITY TEST ELECTRIC: CPT

## 2019-12-08 PROCEDURE — 97161 PT EVAL LOW COMPLEX 20 MIN: CPT

## 2019-12-08 PROCEDURE — 84295 ASSAY OF SERUM SODIUM: CPT

## 2019-12-08 PROCEDURE — 84484 ASSAY OF TROPONIN QUANT: CPT

## 2019-12-08 PROCEDURE — P9037: CPT

## 2019-12-08 PROCEDURE — 86891 AUTOLOGOUS BLOOD OP SALVAGE: CPT

## 2019-12-08 PROCEDURE — 85014 HEMATOCRIT: CPT

## 2019-12-08 PROCEDURE — 83735 ASSAY OF MAGNESIUM: CPT

## 2019-12-08 PROCEDURE — 82803 BLOOD GASES ANY COMBINATION: CPT

## 2019-12-08 RX ORDER — SENNA PLUS 8.6 MG/1
2 TABLET ORAL
Qty: 60 | Refills: 0
Start: 2019-12-08 | End: 2020-01-06

## 2019-12-08 RX ORDER — FAMOTIDINE 10 MG/ML
1 INJECTION INTRAVENOUS
Qty: 30 | Refills: 0
Start: 2019-12-08 | End: 2020-01-06

## 2019-12-08 RX ORDER — ACETAMINOPHEN 500 MG
2 TABLET ORAL
Qty: 0 | Refills: 0 | DISCHARGE
Start: 2019-12-08

## 2019-12-08 RX ORDER — METOPROLOL TARTRATE 50 MG
1 TABLET ORAL
Qty: 60 | Refills: 0
Start: 2019-12-08 | End: 2020-01-06

## 2019-12-08 RX ORDER — ASPIRIN/CALCIUM CARB/MAGNESIUM 324 MG
1 TABLET ORAL
Qty: 30 | Refills: 0
Start: 2019-12-08 | End: 2020-01-06

## 2019-12-08 RX ORDER — ATORVASTATIN CALCIUM 80 MG/1
1 TABLET, FILM COATED ORAL
Qty: 30 | Refills: 0
Start: 2019-12-08 | End: 2020-01-06

## 2019-12-08 RX ADMIN — SPIRONOLACTONE 25 MILLIGRAM(S): 25 TABLET, FILM COATED ORAL at 05:21

## 2019-12-08 RX ADMIN — SODIUM CHLORIDE 3 MILLILITER(S): 9 INJECTION INTRAMUSCULAR; INTRAVENOUS; SUBCUTANEOUS at 13:31

## 2019-12-08 RX ADMIN — CHLORHEXIDINE GLUCONATE 1 APPLICATION(S): 213 SOLUTION TOPICAL at 10:41

## 2019-12-08 RX ADMIN — Medication 81 MILLIGRAM(S): at 11:17

## 2019-12-08 RX ADMIN — Medication 20 MILLIGRAM(S): at 05:21

## 2019-12-08 RX ADMIN — HEPARIN SODIUM 5000 UNIT(S): 5000 INJECTION INTRAVENOUS; SUBCUTANEOUS at 13:30

## 2019-12-08 RX ADMIN — Medication 50 MICROGRAM(S): at 05:25

## 2019-12-08 RX ADMIN — Medication 0.6 MILLIGRAM(S): at 05:21

## 2019-12-08 RX ADMIN — LETROZOLE 2.5 MILLIGRAM(S): 2.5 TABLET, FILM COATED ORAL at 11:18

## 2019-12-08 RX ADMIN — Medication 50 MILLIGRAM(S): at 05:21

## 2019-12-08 RX ADMIN — FAMOTIDINE 20 MILLIGRAM(S): 10 INJECTION INTRAVENOUS at 11:17

## 2019-12-08 RX ADMIN — HEPARIN SODIUM 5000 UNIT(S): 5000 INJECTION INTRAVENOUS; SUBCUTANEOUS at 05:21

## 2019-12-08 RX ADMIN — SODIUM CHLORIDE 3 MILLILITER(S): 9 INJECTION INTRAMUSCULAR; INTRAVENOUS; SUBCUTANEOUS at 05:25

## 2019-12-08 NOTE — DISCHARGE NOTE PROVIDER - CARE PROVIDERS DIRECT ADDRESSES
[email or web address]

## 2019-12-08 NOTE — DISCHARGE NOTE NURSING/CASE MANAGEMENT/SOCIAL WORK - PATIENT PORTAL LINK FT
You can access the FollowMyHealth Patient Portal offered by [facility] by registering at the following website: [email or web address]. By joining [facility]’s FollowMyHealth portal, you will also be able to view your health information using other applications (apps) compatible with our system.

## 2019-12-08 NOTE — DISCHARGE NOTE PROVIDER - NSDCFUADDAPPT_GEN_ALL_CORE_FT
Follow  up appt with Dr. Isiah Mars in 2 weeks - call [phone] to schedule your post-op appt.   follow up appt with Dr. Shar Denney, cardiologist in 2 weeks  follow up appt with your Primary Care MD in 3-4 weeks

## 2019-12-08 NOTE — DISCHARGE NOTE NURSING/CASE MANAGEMENT/SOCIAL WORK - NSDCFUADDAPPT_GEN_ALL_CORE_FT
Follow  up appt with Dr. Isiah Mars in 2 weeks - call [phone] to schedule your post-op appt.   follow up appt with Dr. Shar Denney, cardiologist in 2 weeks  follow up appt with your Primary Care MD in 3-4 weeks

## 2019-12-08 NOTE — DISCHARGE NOTE PROVIDER - NSDCMRMEDTOKEN_GEN_ALL_CORE_FT
acetaminophen 325 mg oral tablet: 2 tab(s) orally every 6 hours, As needed, Mild Pain (1 - 3)  aspirin 81 mg oral delayed release tablet: 1 tab(s) orally once a day  atorvastatin 40 mg oral tablet: 1 tab(s) orally once a day (at bedtime)  famotidine 20 mg oral tablet: 1 tab(s) orally once a day  letrozole 2.5 mg oral tablet: 1 tab(s) orally once a day  levothyroxine 50 mcg (0.05 mg) oral tablet: 1 tab(s) orally once a day (in the morning)  lisinopril 2.5 mg oral tablet: 1 tab(s) orally once a day  metoprolol tartrate 50 mg oral tablet: 1 tab(s) orally 2 times a day  senna oral tablet: 2 tab(s) orally once a day (at bedtime)

## 2019-12-08 NOTE — DISCHARGE NOTE PROVIDER - HOSPITAL COURSE
69F admitted for an Aortic Valve Replacement and Ascending Aneurysm Repair on 12/4/2019. Pt w/ PMH of HTN, HLD, Hypothyroidism and Breast CA (left breast- s/p resection 4/19 w/ chemo/RT and currently on Letrozole).        12/4 S/P Aortic Valve Replacement (porcine valve)  and Ascending Aneurysm Repair/root sparing    12/6 Transferred to floor. L CT in place. Monitor drainage. BB up titrated to lopressor 50 TID    12/7 VSS; + moderate amount of left CT drainage- diuresis and initiate colchicine .6 bid as per Dr. Fernandez; lopressor increased to 50 bid; right sono site by ctu; not able to drain at this time; pt asymptomatic    12/8 rounds made w/ DR. Fernandez - L CT drained 20cc over the last 12 hrs. L CT removed - CXR no ptx. pt eager to be discharged home today.

## 2019-12-08 NOTE — DISCHARGE NOTE PROVIDER - NSDCPNSUBOBJ_GEN_ALL_CORE
PHYSICAL EXAM        Subjective: "I just want to go home."     Neurology: alert and oriented x 3, nonfocal, no gross deficits    CV : tele:  RSR      Sternal Wound :  CDI with dressing , Stable; +pw -vvi 40 ma 10     Lungs: clear. RR easy, unlabored    Abdomen: soft, nontender, nondistended, positive bowel sounds, + bowel movement   Neg N/V/D    :  pt voiding without difficulty     Extremities:   FLORES; trace LE edema, neg calf tenderness.   PPP bilaterally

## 2019-12-08 NOTE — DISCHARGE NOTE PROVIDER - CARE PROVIDER_API CALL
Isiah Mars)  Thoracic and Cardiac Surgery  [address] [place] [address]  Phone: [phone]  Fax: [phone]  Follow Up Time:

## 2019-12-08 NOTE — DISCHARGE NOTE PROVIDER - NSDCCPCAREPLAN_GEN_ALL_CORE_FT
PRINCIPAL DISCHARGE DIAGNOSIS  Diagnosis: S/P AVR (aortic valve replacement) and aortoplasty  Assessment and Plan of Treatment: refer to your Cardiac Surgery Do's & Dont's Fact Sheet  weigh yourself daily - report weight gain > 2.5 pounds overnight to your MD  shower daily - wash your incision with mild soap and water   follow up appt with Dr. Isiah Mars in 2 weeks - call [phone] to schedule your appt. date & time  follow up appt with DR. Shar Denney, cardiologist in 2-3 weeks      SECONDARY DISCHARGE DIAGNOSES  Diagnosis: S/P ascending aortic aneurysm repair  Assessment and Plan of Treatment: same as above

## 2019-12-18 ENCOUNTER — APPOINTMENT (OUTPATIENT)
Dept: CARDIOTHORACIC SURGERY | Facility: CLINIC | Age: 69
End: 2019-12-18

## 2019-12-18 VITALS
RESPIRATION RATE: 13 BRPM | OXYGEN SATURATION: 98 % | SYSTOLIC BLOOD PRESSURE: 98 MMHG | WEIGHT: 108 LBS | BODY MASS INDEX: 21.77 KG/M2 | HEART RATE: 100 BPM | TEMPERATURE: 98 F | HEIGHT: 59 IN | DIASTOLIC BLOOD PRESSURE: 61 MMHG

## 2019-12-18 VITALS — DIASTOLIC BLOOD PRESSURE: 69 MMHG | SYSTOLIC BLOOD PRESSURE: 117 MMHG

## 2019-12-18 RX ORDER — ATORVASTATIN CALCIUM 40 MG/1
40 TABLET, FILM COATED ORAL
Qty: 30 | Refills: 0 | Status: COMPLETED | COMMUNITY
Start: 2019-12-18 | End: 2019-12-18

## 2019-12-18 RX ORDER — MUPIROCIN 20 MG/G
2 OINTMENT TOPICAL
Qty: 1 | Refills: 0 | Status: COMPLETED | COMMUNITY
Start: 2019-11-27 | End: 2019-12-18

## 2019-12-18 RX ORDER — LISINOPRIL 2.5 MG/1
2.5 TABLET ORAL DAILY
Qty: 30 | Refills: 3 | Status: COMPLETED | COMMUNITY
End: 2019-12-18

## 2020-01-08 ENCOUNTER — APPOINTMENT (OUTPATIENT)
Dept: CARDIOTHORACIC SURGERY | Facility: CLINIC | Age: 70
End: 2020-01-08

## 2020-01-08 VITALS
OXYGEN SATURATION: 99 % | RESPIRATION RATE: 13 BRPM | HEIGHT: 59 IN | SYSTOLIC BLOOD PRESSURE: 137 MMHG | WEIGHT: 107 LBS | HEART RATE: 92 BPM | BODY MASS INDEX: 21.57 KG/M2 | DIASTOLIC BLOOD PRESSURE: 87 MMHG

## 2020-01-08 DIAGNOSIS — Z95.828 PRESENCE OF OTHER VASCULAR IMPLANTS AND GRAFTS: ICD-10-CM

## 2020-01-08 DIAGNOSIS — Z95.2 PRESENCE OF PROSTHETIC HEART VALVE: ICD-10-CM

## 2020-01-08 DIAGNOSIS — Z22.321 CARRIER OR SUSPECTED CARRIER OF METHICILLIN SUSCEPTIBLE STAPHYLOCOCCUS AUREUS: ICD-10-CM

## 2020-01-08 RX ORDER — METOPROLOL TARTRATE 50 MG/1
50 TABLET, FILM COATED ORAL
Qty: 180 | Refills: 0 | Status: ACTIVE | COMMUNITY
Start: 1900-01-01 | End: 1900-01-01

## 2020-01-08 RX ORDER — LEVOTHYROXINE SODIUM 0.07 MG/1
75 TABLET ORAL DAILY
Qty: 30 | Refills: 2 | Status: ACTIVE | COMMUNITY
